# Patient Record
Sex: MALE | Employment: OTHER | URBAN - METROPOLITAN AREA
[De-identification: names, ages, dates, MRNs, and addresses within clinical notes are randomized per-mention and may not be internally consistent; named-entity substitution may affect disease eponyms.]

---

## 2018-09-24 ENCOUNTER — HOSPITAL ENCOUNTER (OUTPATIENT)
Dept: PREADMISSION TESTING | Age: 73
Discharge: HOME | End: 2018-09-24
Payer: MEDICARE

## 2018-09-24 VITALS
HEIGHT: 71 IN | SYSTOLIC BLOOD PRESSURE: 174 MMHG | DIASTOLIC BLOOD PRESSURE: 98 MMHG | OXYGEN SATURATION: 98 % | HEART RATE: 64 BPM | BODY MASS INDEX: 27.65 KG/M2 | WEIGHT: 197.5 LBS | TEMPERATURE: 98.6 F | RESPIRATION RATE: 20 BRPM

## 2018-09-24 LAB
ALBUMIN SERPL-MCNC: 4.2 G/DL (ref 3.5–5)
ALBUMIN/GLOB SERPL: 1.7 {RATIO} (ref 1.1–2.2)
ALP SERPL-CCNC: 75 U/L (ref 45–117)
ALT SERPL-CCNC: 42 U/L (ref 12–78)
ANION GAP SERPL CALC-SCNC: 10 MMOL/L (ref 5–15)
APTT PPP: 27.2 SEC (ref 22.1–32)
AST SERPL-CCNC: 25 U/L (ref 15–37)
ATRIAL RATE: 65 BPM
BASOPHILS # BLD: 0.1 K/UL (ref 0–0.1)
BASOPHILS NFR BLD: 1 % (ref 0–1)
BILIRUB SERPL-MCNC: 0.7 MG/DL (ref 0.2–1)
BUN SERPL-MCNC: 10 MG/DL (ref 6–20)
BUN/CREAT SERPL: 12 (ref 12–20)
CALCIUM SERPL-MCNC: 9.1 MG/DL (ref 8.5–10.1)
CALCULATED P AXIS, ECG09: 67 DEGREES
CALCULATED R AXIS, ECG10: 35 DEGREES
CALCULATED T AXIS, ECG11: 58 DEGREES
CHLORIDE SERPL-SCNC: 102 MMOL/L (ref 97–108)
CO2 SERPL-SCNC: 25 MMOL/L (ref 21–32)
CREAT SERPL-MCNC: 0.81 MG/DL (ref 0.7–1.3)
DIAGNOSIS, 93000: NORMAL
DIFFERENTIAL METHOD BLD: ABNORMAL
EOSINOPHIL # BLD: 0.1 K/UL (ref 0–0.4)
EOSINOPHIL NFR BLD: 1 % (ref 0–7)
ERYTHROCYTE [DISTWIDTH] IN BLOOD BY AUTOMATED COUNT: 12.6 % (ref 11.5–14.5)
EST. AVERAGE GLUCOSE BLD GHB EST-MCNC: 123 MG/DL
GLOBULIN SER CALC-MCNC: 2.5 G/DL (ref 2–4)
GLUCOSE SERPL-MCNC: 96 MG/DL (ref 65–100)
HBA1C MFR BLD: 5.9 % (ref 4.2–6.3)
HCT VFR BLD AUTO: 39.9 % (ref 36.6–50.3)
HGB BLD-MCNC: 13.7 G/DL (ref 12.1–17)
IMM GRANULOCYTES # BLD: 0 K/UL (ref 0–0.04)
IMM GRANULOCYTES NFR BLD AUTO: 1 % (ref 0–0.5)
INR PPP: 1 (ref 0.9–1.1)
LYMPHOCYTES # BLD: 1.6 K/UL (ref 0.8–3.5)
LYMPHOCYTES NFR BLD: 22 % (ref 12–49)
MCH RBC QN AUTO: 32.6 PG (ref 26–34)
MCHC RBC AUTO-ENTMCNC: 34.3 G/DL (ref 30–36.5)
MCV RBC AUTO: 95 FL (ref 80–99)
MONOCYTES # BLD: 0.8 K/UL (ref 0–1)
MONOCYTES NFR BLD: 11 % (ref 5–13)
NEUTS SEG # BLD: 4.4 K/UL (ref 1.8–8)
NEUTS SEG NFR BLD: 64 % (ref 32–75)
NRBC # BLD: 0 K/UL (ref 0–0.01)
NRBC BLD-RTO: 0 PER 100 WBC
P-R INTERVAL, ECG05: 164 MS
PLATELET # BLD AUTO: 221 K/UL (ref 150–400)
PMV BLD AUTO: 10.2 FL (ref 8.9–12.9)
POTASSIUM SERPL-SCNC: 4 MMOL/L (ref 3.5–5.1)
PROT SERPL-MCNC: 6.7 G/DL (ref 6.4–8.2)
PROTHROMBIN TIME: 10.6 SEC (ref 9–11.1)
Q-T INTERVAL, ECG07: 430 MS
QRS DURATION, ECG06: 104 MS
QTC CALCULATION (BEZET), ECG08: 447 MS
RBC # BLD AUTO: 4.2 M/UL (ref 4.1–5.7)
SODIUM SERPL-SCNC: 137 MMOL/L (ref 136–145)
THERAPEUTIC RANGE,PTTT: NORMAL SECS (ref 58–77)
VENTRICULAR RATE, ECG03: 65 BPM
WBC # BLD AUTO: 7 K/UL (ref 4.1–11.1)

## 2018-09-24 PROCEDURE — 80053 COMPREHEN METABOLIC PANEL: CPT | Performed by: ORTHOPAEDIC SURGERY

## 2018-09-24 PROCEDURE — 85025 COMPLETE CBC W/AUTO DIFF WBC: CPT | Performed by: ORTHOPAEDIC SURGERY

## 2018-09-24 PROCEDURE — 86900 BLOOD TYPING SEROLOGIC ABO: CPT | Performed by: ORTHOPAEDIC SURGERY

## 2018-09-24 PROCEDURE — 85610 PROTHROMBIN TIME: CPT | Performed by: ORTHOPAEDIC SURGERY

## 2018-09-24 PROCEDURE — 83036 HEMOGLOBIN GLYCOSYLATED A1C: CPT | Performed by: ORTHOPAEDIC SURGERY

## 2018-09-24 PROCEDURE — 36415 COLL VENOUS BLD VENIPUNCTURE: CPT | Performed by: ORTHOPAEDIC SURGERY

## 2018-09-24 PROCEDURE — 85730 THROMBOPLASTIN TIME PARTIAL: CPT | Performed by: ORTHOPAEDIC SURGERY

## 2018-09-24 PROCEDURE — 93005 ELECTROCARDIOGRAM TRACING: CPT

## 2018-09-24 RX ORDER — CHOLECALCIFEROL (VITAMIN D3) 125 MCG
CAPSULE ORAL
COMMUNITY

## 2018-09-24 RX ORDER — IBUPROFEN 200 MG
TABLET ORAL
COMMUNITY
End: 2018-10-09

## 2018-09-24 RX ORDER — LISINOPRIL AND HYDROCHLOROTHIAZIDE 20; 25 MG/1; MG/1
TABLET ORAL DAILY
COMMUNITY

## 2018-09-24 RX ORDER — OMEPRAZOLE 20 MG/1
20 CAPSULE, DELAYED RELEASE ORAL DAILY
COMMUNITY

## 2018-09-24 RX ORDER — SIMVASTATIN 20 MG/1
TABLET, FILM COATED ORAL
COMMUNITY

## 2018-09-24 NOTE — H&P
PAT Pre-Op History & Physical 
 
Patient: Chhaya Mullins                  MRN: [id number]          SSN: [id number] YOB: 1945          Age: 74 y.o. Sex: male Subjective:  
Patient is a 74 y.o.  male who presents with history of chronic left shoulder pain that has been a problem for \" a couple of years\" per patient report. States that about 6 months ago he was working on a car when he felt a \"pop\" in his left shoulder and states that his pain escalated after that event. Rates his left shoulder pain as high as 8/10 at times and describes it as an intermittent sharp, stabbing pain. Raising his left arm exacerbates his shoulder pain and he states that the pain radiates down into his left upper arm at times. C/o weakness in LUE. Patient is right hand dominant. 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 Has failed steroid joint injections and NSAIDs. The patient was evaluated in the surgeon's office and it was determined that the most appropriate plan of care is to proceed with surgical intervention. Patient's PCP Anneliese Vang MD 
 
Patient states her perforated his right eardrum with a Q tip last week- states he saw PCP and was on antibiotic eardrops for a week but has completed medication. Denies any pain in right ear. Past Medical History:  
Diagnosis Date  Arthritis  GERD (gastroesophageal reflux disease)  High cholesterol  Hypertension  Ill-defined condition 09/24/2018 Overweight  BMI= 27.9  Meniere disease 2008 Resolved in about 2 years  Psychiatric disorder 1970  
 anxiety- no longer on medication  PUD (peptic ulcer disease) 1967 Past Surgical History:  
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  HX ORTHOPAEDIC Right 1948  
 right thumb surgery Prior to Admission medications Medication Sig Start Date End Date Taking? Authorizing Provider  
simvastatin (ZOCOR) 20 mg tablet Take  by mouth nightly. Yes Historical Provider  
lisinopril-hydroCHLOROthiazide (PRINZIDE, ZESTORETIC) 20-25 mg per tablet Take  by mouth daily. Yes Historical Provider  
omeprazole (PRILOSEC) 20 mg capsule Take 20 mg by mouth daily. Yes Historical Provider  
melatonin tab tablet Take  by mouth nightly. Yes Historical Provider  
ibuprofen (MOTRIN) 200 mg tablet Take  by mouth every six (6) hours as needed for Pain. Yes Historical Provider Current Outpatient Prescriptions Medication Sig  
 simvastatin (ZOCOR) 20 mg tablet Take  by mouth nightly.  lisinopril-hydroCHLOROthiazide (PRINZIDE, ZESTORETIC) 20-25 mg per tablet Take  by mouth daily.  omeprazole (PRILOSEC) 20 mg capsule Take 20 mg by mouth daily.  melatonin tab tablet Take  by mouth nightly.  ibuprofen (MOTRIN) 200 mg tablet Take  by mouth every six (6) hours as needed for Pain. No current facility-administered medications for this encounter. No Known Allergies Social History Substance Use Topics  Smoking status: Never Smoker  Smokeless tobacco: Former User Quit date: 9/24/2001 Comment: used to dip  Alcohol use 7.2 oz/week 12 Cans of beer per week Comment: No problem if no ETOH for 5 or more days History Drug Use No  
 
Family History Problem Relation Age of Onset  Parkinson's Disease Mother  No Known Problems Father  No Known Problems Brother Review of Systems Patient denies difficulty swallowing, mouth sores, or loose teeth. Patient denies any recent dental procedures or any planned prior to surgery. Patient denies chest pain, tightness, palpitations.   Denies dizziness, visual disturbances, or lightheadedness. Patient denies shortness of breath, wheezing, cough, fever, or chills. Patient denies diarrhea, constipation, or abdominal pain. Patient denies urinary problems including dysuria, hesitancy, urgency, or incontinence. Denies skin breakdown, rashes, insect bites or open area. C/o left shoulder pain. Objective:  
Patient Vitals for the past 24 hrs: 
 Temp Pulse Resp BP SpO2  
18 1122 98.6 °F (37 °C) 64 20 (!) 174/98 98 % Temp (24hrs), Av.6 °F (37 °C), Min:98.6 °F (37 °C), Max:98.6 °F (37 °C) Body mass index is 27.94 kg/(m^2). Wt Readings from Last 1 Encounters:  
18 89.6 kg (197 lb 8 oz) Physical Exam: 
 
 General: Pleasant,  cooperative, no apparent distress, appears stated age. Eyes: Conjunctivae/corneas clear. EOMs intact. Ears: Rt TM perforated. Lt TM intact- pearly gray. Nose: Nares normal. 
 Mouth/Throat: Lips, mucosa, and tongue normal. Teeth and gums normal. 
 Neck: Supple, symmetrical, trachea midline. Back: Symmetric Lungs: Clear to auscultation bilaterally. Heart: Regular rate and rhythm, S1, S2 normal. No murmur, click, rub or gallop. Abdomen: Soft, non-tender. Bowel sounds normal. No distention. Musculoskeletal:  Left shoulder ROM limited by discomfort Extremities:  Extremities normal, atraumatic, no cyanosis or edema. Calves 
                               supple, non tender to palpation. Pulses: 2+ and symmetric bilateral upper extremities. Cap. refill <2 seconds Skin: Skin color, texture, turgor normal.  No visible rashes or lesions. Neurologic: CN II-XII grossly intact. Alert and oriented x3. Labs:  
Recent Results (from the past 72 hour(s)) CULTURE, MRSA Collection Time: 18 12:12 PM  
Result Value Ref Range Special Requests: NO SPECIAL REQUESTS Culture result: MRSA NOT PRESENT  Culture result:     
      Screening of patient nares for MRSA is for surveillance purposes and, if positive, to facilitate isolation considerations in high risk settings. It is not intended for automatic decolonization interventions per se as regimens are not sufficiently effective to warrant routine use. CBC WITH AUTOMATED DIFF Collection Time: 09/24/18 12:17 PM  
Result Value Ref Range WBC 7.0 4.1 - 11.1 K/uL  
 RBC 4.20 4. 10 - 5.70 M/uL  
 HGB 13.7 12.1 - 17.0 g/dL HCT 39.9 36.6 - 50.3 % MCV 95.0 80.0 - 99.0 FL  
 MCH 32.6 26.0 - 34.0 PG  
 MCHC 34.3 30.0 - 36.5 g/dL  
 RDW 12.6 11.5 - 14.5 % PLATELET 074 790 - 852 K/uL MPV 10.2 8.9 - 12.9 FL  
 NRBC 0.0 0  WBC ABSOLUTE NRBC 0.00 0.00 - 0.01 K/uL NEUTROPHILS 64 32 - 75 % LYMPHOCYTES 22 12 - 49 % MONOCYTES 11 5 - 13 % EOSINOPHILS 1 0 - 7 % BASOPHILS 1 0 - 1 % IMMATURE GRANULOCYTES 1 (H) 0.0 - 0.5 % ABS. NEUTROPHILS 4.4 1.8 - 8.0 K/UL  
 ABS. LYMPHOCYTES 1.6 0.8 - 3.5 K/UL  
 ABS. MONOCYTES 0.8 0.0 - 1.0 K/UL  
 ABS. EOSINOPHILS 0.1 0.0 - 0.4 K/UL  
 ABS. BASOPHILS 0.1 0.0 - 0.1 K/UL  
 ABS. IMM. GRANS. 0.0 0.00 - 0.04 K/UL  
 DF AUTOMATED METABOLIC PANEL, COMPREHENSIVE Collection Time: 09/24/18 12:17 PM  
Result Value Ref Range Sodium 137 136 - 145 mmol/L Potassium 4.0 3.5 - 5.1 mmol/L Chloride 102 97 - 108 mmol/L  
 CO2 25 21 - 32 mmol/L Anion gap 10 5 - 15 mmol/L Glucose 96 65 - 100 mg/dL BUN 10 6 - 20 MG/DL Creatinine 0.81 0.70 - 1.30 MG/DL  
 BUN/Creatinine ratio 12 12 - 20 GFR est AA >60 >60 ml/min/1.73m2 GFR est non-AA >60 >60 ml/min/1.73m2 Calcium 9.1 8.5 - 10.1 MG/DL Bilirubin, total 0.7 0.2 - 1.0 MG/DL  
 ALT (SGPT) 42 12 - 78 U/L  
 AST (SGOT) 25 15 - 37 U/L Alk. phosphatase 75 45 - 117 U/L Protein, total 6.7 6.4 - 8.2 g/dL Albumin 4.2 3.5 - 5.0 g/dL Globulin 2.5 2.0 - 4.0 g/dL A-G Ratio 1.7 1.1 - 2.2 HEMOGLOBIN A1C WITH EAG Collection Time: 09/24/18 12:17 PM  
Result Value Ref Range Hemoglobin A1c 5.9 4.2 - 6.3 % Est. average glucose 123 mg/dL PROTHROMBIN TIME + INR Collection Time: 09/24/18 12:17 PM  
Result Value Ref Range INR 1.0 0.9 - 1.1 Prothrombin time 10.6 9.0 - 11.1 sec PTT Collection Time: 09/24/18 12:17 PM  
Result Value Ref Range aPTT 27.2 22.1 - 32.0 sec  
 aPTT, therapeutic range     58.0 - 77.0 SECS  
TYPE & SCREEN Collection Time: 09/24/18 12:17 PM  
Result Value Ref Range Crossmatch Expiration 09/27/2018 ABO/Rh(D) O POSITIVE Antibody screen NEG   
EKG, 12 LEAD, INITIAL Collection Time: 09/24/18 12:37 PM  
Result Value Ref Range Ventricular Rate 65 BPM  
 Atrial Rate 65 BPM  
 P-R Interval 164 ms QRS Duration 104 ms Q-T Interval 430 ms QTC Calculation (Bezet) 447 ms Calculated P Axis 67 degrees Calculated R Axis 35 degrees Calculated T Axis 58 degrees Diagnosis Normal sinus rhythm RSR' pattern in V1 Borderline ECG No previous ECGs available Confirmed by Ventura Allen MD, [id number] ([id number]) on 9/24/2018 2:53:02 PM 
  
URINALYSIS W/ REFLEX CULTURE Collection Time: 09/25/18 11:54 AM  
Result Value Ref Range Color YELLOW/STRAW Appearance CLEAR CLEAR Specific gravity 1.006 1.003 - 1.030    
 pH (UA) 6.5 5.0 - 8.0 Protein NEGATIVE  NEG mg/dL Glucose NEGATIVE  NEG mg/dL Ketone NEGATIVE  NEG mg/dL Bilirubin NEGATIVE  NEG Blood NEGATIVE  NEG Urobilinogen 0.2 0.2 - 1.0 EU/dL Nitrites NEGATIVE  NEG Leukocyte Esterase NEGATIVE  NEG    
 WBC 0-4 0 - 4 /hpf  
 RBC 0-5 0 - 5 /hpf Epithelial cells FEW FEW /lpf Bacteria NEGATIVE  NEG /hpf  
 UA:UC IF INDICATED CULTURE NOT INDICATED BY UA RESULT CNI Hyaline cast 0-2 0 - 5 /lpf Assessment:  
 
Osteoarthritis of left glenohumeral joint Bicep tendinitis of left upper extremity. Perforated right tympanic membrane Plan:  
 
Scheduled for left total shoulder arthroplasty with biceps tenodesis and axillary nerve dissection. Labs and EKG done per surgeon's orders. Lab and EKG results reviewed- unremarkable. MRSA negative. Surgeon's office notified of perforated right eardrum.  
 
 
 
 
Susan Sainz NP

## 2018-09-24 NOTE — PERIOP NOTES
INSTRUCTIONS [address] [address] [address] [facility] MAIN OR [phone] MAIN PRE OP [phone] AMBULATORY PRE OP [phone] PRE-ADMISSION TESTING 21  Surgery Date:   10/8/18 Is surgery arrival time given by surgeon? NO [facility] will [facility] you between 3 and 7pm the day before your surgery with your arrival time. (If your surgery is on a Monday, we will call you the Friday before.) Call [phone] after 7pm Monday-Friday if you did not receive your arrival time. Answers to Common Questions When You 
Arrive Arrive at the [facility] [address] [facility] on the day of your surgery Have your insurance card, photo ID, and any copayment (if needed) Food 
 and  
Drink NO food or drink after midnight the night before surgery This means NO water, gum, mints, coffee, juice, etc. 
No alcohol (beer, wine, liquor) 24 hours before and after surgery Medicine to TAKE Morning of Surgery MEDICATIONS TO TAKE THE MORNING OF SURGERY WITH A SIP OF WATER:  
? Omeprazole Medicine To 
STOP  
FOR PAIN 
? You can take Tylenol  follow instructions on the bottle 
? NO Aspirin for pain ? NO Non-Steroidal Anti-Inflammatory Drugs (NSAIDs:  
for example, Ibuprofen (Advil, Motrin), Naproxen (Aleve) ? STOP herbal supplements and vitamins 1 week before surgery Blood Thinners ? If you take Aspirin, Plavix, Coumadin, blood-thinning or anti-clot medicine, talk to your surgeon and/or follow the instructions from the doctor who told you to take that medicine Clothing Jewelry Valuables Bathing CLOTHING 
? Wear loose, comfortable clothes ? Wear glasses instead of contacts ? Leave money, jewelry and valuables at home ? No make-up, particularly mascara, the day of surgery ? REMOVE ALL piercings, rings, and jewelry - leave at home ? Wear hair loose or down; no pony-tails, buns, or metal hair clips BATHING 
? Follow all special bath instructions (for total joint replacement, spine and bowel surgeries.) ? If you shower the morning of surgery, please do not apply any lotions, powders, or deodorants afterwards. Do not shave or trim anywhere 24 hours before surgery. Going Home 
or Spending the Night  
? SAME-DAY SURGERY: You must have a responsible adult drive you home and stay with you 24 hours after surgery ? ADMITS: If your doctor is keeping you into the hospital after surgery, leave personal belongings/luggage in your car until you have a hospital room number. Hospital discharge time is 12 noon Drivers must be here before 12 noon unless you are told differently Follow all instructions so your surgery wont be cancelled. Please, be on time. If a situation occurs and you are delayed the day of surgery, call [phone] or [phone]. If your physical condition changes (like a fever, cold, flu, etc.) call your surgeon as soon as possible. The [facility] staff can be reached at [phone]. OTHER SPECIAL INSTRUCTIONS:  Free  parking, Special Instructions: · Use Chlorhexidine Care Fusion wash and sponges 3 days prior to surgery as instructed. · Incentive spirometer given with instructions to practice at home and bring back to the hospital on the day of surgery. · [facility] will contact you if your Hemoglobin A1C is greater than 7.5. · Ensure/Glucerna  sample, nutritional information, and Ensure/Glucerna coupon given. · Pain pamphlet and Call Don't Fall reminder reviewed with patient. ·  parking is complimentary Monday - Friday 7 am - 5 pm 
· Bring PTA Medication list day of surgery with the last doses taken documented Do not bring medication bottles the day of surgery The patient was contacted  in person. He  verbalize  understanding of all instructions and does not  need reinforcement.

## 2018-09-24 NOTE — PERIOP NOTES
Notified by lab of mislabelled urine specimen grey top. Called patient. Explained mislabel of urine specimen and need for repeat at his convenience in the next week. Explained importance of urine specimen in prevention of surgical complications. Patient agreed to return tomorrow 9/25/18 for urine specimen for urine with reflex culture.

## 2018-09-25 ENCOUNTER — HOSPITAL ENCOUNTER (OUTPATIENT)
Dept: PREADMISSION TESTING | Age: 73
Discharge: HOME | End: 2018-09-25
Payer: MEDICARE

## 2018-09-25 LAB
APPEARANCE UR: CLEAR
BACTERIA SPEC CULT: NORMAL
BACTERIA SPEC CULT: NORMAL
BACTERIA URNS QL MICRO: NEGATIVE /HPF
BILIRUB UR QL: NEGATIVE
COLOR UR: NORMAL
EPITH CASTS URNS QL MICRO: NORMAL /LPF
GLUCOSE UR STRIP.AUTO-MCNC: NEGATIVE MG/DL
HGB UR QL STRIP: NEGATIVE
HYALINE CASTS URNS QL MICRO: NORMAL /LPF (ref 0–5)
KETONES UR QL STRIP.AUTO: NEGATIVE MG/DL
LEUKOCYTE ESTERASE UR QL STRIP.AUTO: NEGATIVE
NITRITE UR QL STRIP.AUTO: NEGATIVE
PH UR STRIP: 6.5 [PH] (ref 5–8)
PROT UR STRIP-MCNC: NEGATIVE MG/DL
RBC #/AREA URNS HPF: NORMAL /HPF (ref 0–5)
SERVICE CMNT-IMP: NORMAL
SP GR UR REFRACTOMETRY: 1.01 (ref 1–1.03)
UA: UC IF INDICATED,UAUC: NORMAL
UROBILINOGEN UR QL STRIP.AUTO: 0.2 EU/DL (ref 0.2–1)
WBC URNS QL MICRO: NORMAL /HPF (ref 0–4)

## 2018-09-25 PROCEDURE — 81001 URINALYSIS AUTO W/SCOPE: CPT | Performed by: ANESTHESIOLOGY

## 2018-10-05 ENCOUNTER — ANESTHESIA EVENT (OUTPATIENT)
Dept: SURGERY | Age: 73
DRG: 483 | End: 2018-10-05
Payer: MEDICARE

## 2018-10-05 LAB
ABO + RH BLD: NORMAL
BLOOD GROUP ANTIBODIES SERPL: NORMAL
SPECIMEN EXP DATE BLD: NORMAL

## 2018-10-05 NOTE — PERIOP NOTES
Faxed the blood transfusion history questionnaire to the blood bank and spoke to Maykel Garduno requesting that the T&S expiration date be extended to the day of surgery. DOS: 10/8/2018

## 2018-10-08 ENCOUNTER — APPOINTMENT (OUTPATIENT)
Dept: GENERAL RADIOLOGY | Age: 73
DRG: 483 | End: 2018-10-08
Attending: ORTHOPAEDIC SURGERY
Payer: MEDICARE

## 2018-10-08 ENCOUNTER — ANESTHESIA (OUTPATIENT)
Dept: SURGERY | Age: 73
DRG: 483 | End: 2018-10-08
Payer: MEDICARE

## 2018-10-08 ENCOUNTER — HOSPITAL ENCOUNTER (INPATIENT)
Age: 73
LOS: 1 days | Discharge: HOME | DRG: 483 | End: 2018-10-09
Attending: ORTHOPAEDIC SURGERY | Admitting: ORTHOPAEDIC SURGERY
Payer: MEDICARE

## 2018-10-08 DIAGNOSIS — M19.012 PRIMARY OSTEOARTHRITIS OF LEFT SHOULDER: Primary | ICD-10-CM

## 2018-10-08 PROCEDURE — C1776 JOINT DEVICE (IMPLANTABLE): HCPCS | Performed by: ORTHOPAEDIC SURGERY

## 2018-10-08 PROCEDURE — 77030013837 HC NERV BLK KT BBMI -B

## 2018-10-08 PROCEDURE — 77030020268 HC MISC GENERAL SUPPLY: Performed by: ORTHOPAEDIC SURGERY

## 2018-10-08 PROCEDURE — 77030020782 HC GWN BAIR PAWS FLX 3M -B

## 2018-10-08 PROCEDURE — 77030019908 HC STETH ESOPH SIMS -A: Performed by: NURSE ANESTHETIST, CERTIFIED REGISTERED

## 2018-10-08 PROCEDURE — C1713 ANCHOR/SCREW BN/BN,TIS/BN: HCPCS | Performed by: ORTHOPAEDIC SURGERY

## 2018-10-08 PROCEDURE — 65270000029 HC RM PRIVATE

## 2018-10-08 PROCEDURE — 77030018836 HC SOL IRR NACL ICUM -A: Performed by: ORTHOPAEDIC SURGERY

## 2018-10-08 PROCEDURE — 77030039266 HC ADH SKN EXOFIN S2SG -A: Performed by: ORTHOPAEDIC SURGERY

## 2018-10-08 PROCEDURE — 3E0T3BZ INTRODUCTION OF ANESTHETIC AGENT INTO PERIPHERAL NERVES AND PLEXI, PERCUTANEOUS APPROACH: ICD-10-PCS | Performed by: ANESTHESIOLOGY

## 2018-10-08 PROCEDURE — 0RRK0JZ REPLACEMENT OF LEFT SHOULDER JOINT WITH SYNTHETIC SUBSTITUTE, OPEN APPROACH: ICD-10-PCS | Performed by: ORTHOPAEDIC SURGERY

## 2018-10-08 PROCEDURE — 77030032497 HC WRP SHLDR WO BGS SOLM -B

## 2018-10-08 PROCEDURE — 77030002933 HC SUT MCRYL J&J -A: Performed by: ORTHOPAEDIC SURGERY

## 2018-10-08 PROCEDURE — 77030021303 HC BUR FLUT MIDAS BRSM -B: Performed by: ORTHOPAEDIC SURGERY

## 2018-10-08 PROCEDURE — 74011250636 HC RX REV CODE- 250/636

## 2018-10-08 PROCEDURE — 76060000035 HC ANESTHESIA 2 TO 2.5 HR: Performed by: ORTHOPAEDIC SURGERY

## 2018-10-08 PROCEDURE — 77030028700 HC BLD TISS PLSM MEDT -E: Performed by: ORTHOPAEDIC SURGERY

## 2018-10-08 PROCEDURE — 77030002922 HC SUT FBRWRE ARTH -B: Performed by: ORTHOPAEDIC SURGERY

## 2018-10-08 PROCEDURE — 77030031139 HC SUT VCRL2 J&J -A: Performed by: ORTHOPAEDIC SURGERY

## 2018-10-08 PROCEDURE — 74011000272 HC RX REV CODE- 272: Performed by: ORTHOPAEDIC SURGERY

## 2018-10-08 PROCEDURE — 74011250636 HC RX REV CODE- 250/636: Performed by: ANESTHESIOLOGY

## 2018-10-08 PROCEDURE — 74011250636 HC RX REV CODE- 250/636: Performed by: ORTHOPAEDIC SURGERY

## 2018-10-08 PROCEDURE — 74011250637 HC RX REV CODE- 250/637: Performed by: ANESTHESIOLOGY

## 2018-10-08 PROCEDURE — 77030010785: Performed by: ORTHOPAEDIC SURGERY

## 2018-10-08 PROCEDURE — 77030018673: Performed by: ORTHOPAEDIC SURGERY

## 2018-10-08 PROCEDURE — 77030008684 HC TU ET CUF COVD -B: Performed by: NURSE ANESTHETIST, CERTIFIED REGISTERED

## 2018-10-08 PROCEDURE — 77030038020 HC MANFLD NEPTUNE STRY -B: Performed by: ORTHOPAEDIC SURGERY

## 2018-10-08 PROCEDURE — 77030010507 HC ADH SKN DERMBND J&J -B

## 2018-10-08 PROCEDURE — 77030011640 HC PAD GRND REM COVD -A: Performed by: ORTHOPAEDIC SURGERY

## 2018-10-08 PROCEDURE — 77030020788: Performed by: ORTHOPAEDIC SURGERY

## 2018-10-08 PROCEDURE — 0LS40ZZ REPOSITION LEFT UPPER ARM TENDON, OPEN APPROACH: ICD-10-PCS | Performed by: ORTHOPAEDIC SURGERY

## 2018-10-08 PROCEDURE — 74011000250 HC RX REV CODE- 250: Performed by: ORTHOPAEDIC SURGERY

## 2018-10-08 PROCEDURE — 77030026438 HC STYL ET INTUB CARD -A: Performed by: NURSE ANESTHETIST, CERTIFIED REGISTERED

## 2018-10-08 PROCEDURE — 77030013708 HC HNDPC SUC IRR PULS STRY –B: Performed by: ORTHOPAEDIC SURGERY

## 2018-10-08 PROCEDURE — 94762 N-INVAS EAR/PLS OXIMTRY CONT: CPT

## 2018-10-08 PROCEDURE — 64415 NJX AA&/STRD BRCH PLXS IMG: CPT

## 2018-10-08 PROCEDURE — 76210000000 HC OR PH I REC 2 TO 2.5 HR: Performed by: ORTHOPAEDIC SURGERY

## 2018-10-08 PROCEDURE — 01N30ZZ RELEASE BRACHIAL PLEXUS, OPEN APPROACH: ICD-10-PCS | Performed by: ORTHOPAEDIC SURGERY

## 2018-10-08 PROCEDURE — 74011000250 HC RX REV CODE- 250: Performed by: ANESTHESIOLOGY

## 2018-10-08 PROCEDURE — 76010000171 HC OR TIME 2 TO 2.5 HR INTENSV-TIER 1: Performed by: ORTHOPAEDIC SURGERY

## 2018-10-08 PROCEDURE — 74011250637 HC RX REV CODE- 250/637: Performed by: ORTHOPAEDIC SURGERY

## 2018-10-08 PROCEDURE — 77030003882 HC BIT DRL TWST BRSM -B: Performed by: ORTHOPAEDIC SURGERY

## 2018-10-08 PROCEDURE — 77030018883 HC BLD SAW SAG4 STRY -B: Performed by: ORTHOPAEDIC SURGERY

## 2018-10-08 PROCEDURE — 74011000250 HC RX REV CODE- 250

## 2018-10-08 PROCEDURE — 73020 X-RAY EXAM OF SHOULDER: CPT

## 2018-10-08 DEVICE — IMPLANTABLE DEVICE
Type: IMPLANTABLE DEVICE | Site: SHOULDER | Status: FUNCTIONAL
Brand: COMPREHENSIVE SHOULDER SYSTEM

## 2018-10-08 DEVICE — COMP SHRT ST/RGX GLD/VRSDL/INS: Type: IMPLANTABLE DEVICE | Status: FUNCTIONAL

## 2018-10-08 DEVICE — IMPLANTABLE DEVICE
Type: IMPLANTABLE DEVICE | Site: SHOULDER | Status: FUNCTIONAL
Brand: HYBRID GLENOID

## 2018-10-08 DEVICE — SIMPLEX® HV WITH GENTAMICIN IS A FAST-SETTING ACRYLIC RESIN WITH ADDITION OF GENTAMICIN SULFATE FOR USE IN BONE SURGERY. MIXING THE TWO SEPARATE STERILE COMPONENTS PRODUCES A DUCTILE BONE CEMENT WHICH, AFTER HARDENING, FIXES THE IMPLANT AND TRANSFERS STRESSES PRODUCED DURING MOVEMENT EVENLY TO THE BONE. SIMPLEX® HV WITH GENTAMICINN CEMENT POWDER ALSO CONTAINS INSOLUBLE ZIRCONIUM DIOXIDE AS AN X-RAY CONTRAST MEDIUM. SIMPLEX® HV WITH GENTAMICIN DOES NOT EMIT A SIGNAL AND DOES NOT POSE A SAFETY RISK IN A MAGNETIC RESONANCE ENVIRONMENT.
Type: IMPLANTABLE DEVICE | Site: SHOULDER | Status: FUNCTIONAL
Brand: SIMPLEX HV WITH GENTAMICIN

## 2018-10-08 RX ORDER — EPHEDRINE SULFATE 50 MG/ML
INJECTION, SOLUTION INTRAVENOUS AS NEEDED
Status: DISCONTINUED | OUTPATIENT
Start: 2018-10-08 | End: 2018-10-08 | Stop reason: HOSPADM

## 2018-10-08 RX ORDER — OXYCODONE HYDROCHLORIDE 5 MG/1
5 TABLET ORAL
Status: DISCONTINUED | OUTPATIENT
Start: 2018-10-08 | End: 2018-10-09 | Stop reason: HOSPADM

## 2018-10-08 RX ORDER — TRAMADOL HYDROCHLORIDE 50 MG/1
50 TABLET ORAL
Status: DISCONTINUED | OUTPATIENT
Start: 2018-10-08 | End: 2018-10-09 | Stop reason: HOSPADM

## 2018-10-08 RX ORDER — HYDROMORPHONE HYDROCHLORIDE 1 MG/ML
.25-1 INJECTION, SOLUTION INTRAMUSCULAR; INTRAVENOUS; SUBCUTANEOUS
Status: DISCONTINUED | OUTPATIENT
Start: 2018-10-08 | End: 2018-10-08 | Stop reason: HOSPADM

## 2018-10-08 RX ORDER — SODIUM CHLORIDE, SODIUM LACTATE, POTASSIUM CHLORIDE, CALCIUM CHLORIDE 600; 310; 30; 20 MG/100ML; MG/100ML; MG/100ML; MG/100ML
125 INJECTION, SOLUTION INTRAVENOUS CONTINUOUS
Status: DISCONTINUED | OUTPATIENT
Start: 2018-10-08 | End: 2018-10-08 | Stop reason: HOSPADM

## 2018-10-08 RX ORDER — PROPOFOL 10 MG/ML
INJECTION, EMULSION INTRAVENOUS AS NEEDED
Status: DISCONTINUED | OUTPATIENT
Start: 2018-10-08 | End: 2018-10-08 | Stop reason: HOSPADM

## 2018-10-08 RX ORDER — FENTANYL CITRATE 50 UG/ML
INJECTION, SOLUTION INTRAMUSCULAR; INTRAVENOUS AS NEEDED
Status: DISCONTINUED | OUTPATIENT
Start: 2018-10-08 | End: 2018-10-08 | Stop reason: HOSPADM

## 2018-10-08 RX ORDER — HYDROMORPHONE HYDROCHLORIDE 2 MG/ML
0.5 INJECTION, SOLUTION INTRAMUSCULAR; INTRAVENOUS; SUBCUTANEOUS
Status: DISCONTINUED | OUTPATIENT
Start: 2018-10-08 | End: 2018-10-09 | Stop reason: HOSPADM

## 2018-10-08 RX ORDER — SODIUM CHLORIDE 0.9 % (FLUSH) 0.9 %
5-10 SYRINGE (ML) INJECTION EVERY 8 HOURS
Status: DISCONTINUED | OUTPATIENT
Start: 2018-10-08 | End: 2018-10-08 | Stop reason: HOSPADM

## 2018-10-08 RX ORDER — SODIUM CHLORIDE 9 MG/ML
125 INJECTION, SOLUTION INTRAVENOUS CONTINUOUS
Status: DISCONTINUED | OUTPATIENT
Start: 2018-10-08 | End: 2018-10-09 | Stop reason: HOSPADM

## 2018-10-08 RX ORDER — ASPIRIN 325 MG
325 TABLET, DELAYED RELEASE (ENTERIC COATED) ORAL 2 TIMES DAILY
Status: DISCONTINUED | OUTPATIENT
Start: 2018-10-08 | End: 2018-10-09 | Stop reason: HOSPADM

## 2018-10-08 RX ORDER — ONDANSETRON 2 MG/ML
4 INJECTION INTRAMUSCULAR; INTRAVENOUS
Status: DISCONTINUED | OUTPATIENT
Start: 2018-10-08 | End: 2018-10-09 | Stop reason: HOSPADM

## 2018-10-08 RX ORDER — AMOXICILLIN 250 MG
1 CAPSULE ORAL 2 TIMES DAILY
Status: DISCONTINUED | OUTPATIENT
Start: 2018-10-08 | End: 2018-10-09 | Stop reason: HOSPADM

## 2018-10-08 RX ORDER — OXYCODONE HYDROCHLORIDE 5 MG/1
10 TABLET ORAL
Status: DISCONTINUED | OUTPATIENT
Start: 2018-10-08 | End: 2018-10-09 | Stop reason: HOSPADM

## 2018-10-08 RX ORDER — OXYCODONE HYDROCHLORIDE 5 MG/1
5 TABLET ORAL
Status: DISCONTINUED | OUTPATIENT
Start: 2018-10-08 | End: 2018-10-08 | Stop reason: HOSPADM

## 2018-10-08 RX ORDER — SODIUM CHLORIDE 0.9 % (FLUSH) 0.9 %
5-10 SYRINGE (ML) INJECTION AS NEEDED
Status: DISCONTINUED | OUTPATIENT
Start: 2018-10-08 | End: 2018-10-08 | Stop reason: HOSPADM

## 2018-10-08 RX ORDER — FAMOTIDINE 20 MG/1
20 TABLET, FILM COATED ORAL 2 TIMES DAILY
Status: DISCONTINUED | OUTPATIENT
Start: 2018-10-08 | End: 2018-10-09 | Stop reason: HOSPADM

## 2018-10-08 RX ORDER — DIPHENHYDRAMINE HYDROCHLORIDE 50 MG/ML
12.5 INJECTION, SOLUTION INTRAMUSCULAR; INTRAVENOUS AS NEEDED
Status: DISCONTINUED | OUTPATIENT
Start: 2018-10-08 | End: 2018-10-08 | Stop reason: HOSPADM

## 2018-10-08 RX ORDER — POLYETHYLENE GLYCOL 3350 17 G/17G
17 POWDER, FOR SOLUTION ORAL DAILY
Status: DISCONTINUED | OUTPATIENT
Start: 2018-10-09 | End: 2018-10-09 | Stop reason: HOSPADM

## 2018-10-08 RX ORDER — GLYCOPYRROLATE 0.2 MG/ML
INJECTION INTRAMUSCULAR; INTRAVENOUS AS NEEDED
Status: DISCONTINUED | OUTPATIENT
Start: 2018-10-08 | End: 2018-10-08 | Stop reason: HOSPADM

## 2018-10-08 RX ORDER — ONDANSETRON 2 MG/ML
INJECTION INTRAMUSCULAR; INTRAVENOUS AS NEEDED
Status: DISCONTINUED | OUTPATIENT
Start: 2018-10-08 | End: 2018-10-08 | Stop reason: HOSPADM

## 2018-10-08 RX ORDER — NEOSTIGMINE METHYLSULFATE 1 MG/ML
INJECTION INTRAVENOUS AS NEEDED
Status: DISCONTINUED | OUTPATIENT
Start: 2018-10-08 | End: 2018-10-08 | Stop reason: HOSPADM

## 2018-10-08 RX ORDER — SODIUM CHLORIDE 0.9 % (FLUSH) 0.9 %
5-10 SYRINGE (ML) INJECTION EVERY 8 HOURS
Status: DISCONTINUED | OUTPATIENT
Start: 2018-10-08 | End: 2018-10-09 | Stop reason: HOSPADM

## 2018-10-08 RX ORDER — CELECOXIB 100 MG/1
100 CAPSULE ORAL ONCE
Status: COMPLETED | OUTPATIENT
Start: 2018-10-08 | End: 2018-10-08

## 2018-10-08 RX ORDER — DIPHENHYDRAMINE HCL 12.5MG/5ML
12.5 ELIXIR ORAL
Status: DISCONTINUED | OUTPATIENT
Start: 2018-10-08 | End: 2018-10-09 | Stop reason: HOSPADM

## 2018-10-08 RX ORDER — FLUMAZENIL 0.1 MG/ML
0.2 INJECTION INTRAVENOUS
Status: DISCONTINUED | OUTPATIENT
Start: 2018-10-08 | End: 2018-10-08 | Stop reason: HOSPADM

## 2018-10-08 RX ORDER — ROCURONIUM BROMIDE 10 MG/ML
INJECTION, SOLUTION INTRAVENOUS AS NEEDED
Status: DISCONTINUED | OUTPATIENT
Start: 2018-10-08 | End: 2018-10-08 | Stop reason: HOSPADM

## 2018-10-08 RX ORDER — NALOXONE HYDROCHLORIDE 0.4 MG/ML
0.4 INJECTION, SOLUTION INTRAMUSCULAR; INTRAVENOUS; SUBCUTANEOUS AS NEEDED
Status: DISCONTINUED | OUTPATIENT
Start: 2018-10-08 | End: 2018-10-09 | Stop reason: HOSPADM

## 2018-10-08 RX ORDER — OXYCODONE HCL 10 MG/1
10 TABLET, FILM COATED, EXTENDED RELEASE ORAL ONCE
Status: COMPLETED | OUTPATIENT
Start: 2018-10-08 | End: 2018-10-08

## 2018-10-08 RX ORDER — SODIUM CHLORIDE 0.9 % (FLUSH) 0.9 %
5-10 SYRINGE (ML) INJECTION AS NEEDED
Status: DISCONTINUED | OUTPATIENT
Start: 2018-10-08 | End: 2018-10-09 | Stop reason: HOSPADM

## 2018-10-08 RX ORDER — ACETAMINOPHEN 325 MG/1
650 TABLET ORAL EVERY 6 HOURS
Status: DISCONTINUED | OUTPATIENT
Start: 2018-10-08 | End: 2018-10-09 | Stop reason: HOSPADM

## 2018-10-08 RX ORDER — LIDOCAINE HYDROCHLORIDE 10 MG/ML
0.1 INJECTION, SOLUTION EPIDURAL; INFILTRATION; INTRACAUDAL; PERINEURAL AS NEEDED
Status: DISCONTINUED | OUTPATIENT
Start: 2018-10-08 | End: 2018-10-08 | Stop reason: HOSPADM

## 2018-10-08 RX ORDER — LIDOCAINE HYDROCHLORIDE 20 MG/ML
INJECTION, SOLUTION EPIDURAL; INFILTRATION; INTRACAUDAL; PERINEURAL AS NEEDED
Status: DISCONTINUED | OUTPATIENT
Start: 2018-10-08 | End: 2018-10-08 | Stop reason: HOSPADM

## 2018-10-08 RX ORDER — FENTANYL CITRATE 50 UG/ML
25 INJECTION, SOLUTION INTRAMUSCULAR; INTRAVENOUS
Status: DISCONTINUED | OUTPATIENT
Start: 2018-10-08 | End: 2018-10-08 | Stop reason: HOSPADM

## 2018-10-08 RX ORDER — CEFAZOLIN SODIUM/WATER 2 G/20 ML
2 SYRINGE (ML) INTRAVENOUS ONCE
Status: COMPLETED | OUTPATIENT
Start: 2018-10-08 | End: 2018-10-08

## 2018-10-08 RX ORDER — DEXAMETHASONE SODIUM PHOSPHATE 4 MG/ML
INJECTION, SOLUTION INTRA-ARTICULAR; INTRALESIONAL; INTRAMUSCULAR; INTRAVENOUS; SOFT TISSUE AS NEEDED
Status: DISCONTINUED | OUTPATIENT
Start: 2018-10-08 | End: 2018-10-08 | Stop reason: HOSPADM

## 2018-10-08 RX ORDER — NALOXONE HYDROCHLORIDE 0.4 MG/ML
0.2 INJECTION, SOLUTION INTRAMUSCULAR; INTRAVENOUS; SUBCUTANEOUS
Status: DISCONTINUED | OUTPATIENT
Start: 2018-10-08 | End: 2018-10-08 | Stop reason: HOSPADM

## 2018-10-08 RX ORDER — KETOROLAC TROMETHAMINE 30 MG/ML
15 INJECTION, SOLUTION INTRAMUSCULAR; INTRAVENOUS
Status: DISCONTINUED | OUTPATIENT
Start: 2018-10-08 | End: 2018-10-08 | Stop reason: HOSPADM

## 2018-10-08 RX ORDER — MIDAZOLAM HYDROCHLORIDE 1 MG/ML
INJECTION, SOLUTION INTRAMUSCULAR; INTRAVENOUS AS NEEDED
Status: DISCONTINUED | OUTPATIENT
Start: 2018-10-08 | End: 2018-10-08 | Stop reason: HOSPADM

## 2018-10-08 RX ORDER — ACETAMINOPHEN 325 MG/1
975 TABLET ORAL ONCE
Status: COMPLETED | OUTPATIENT
Start: 2018-10-08 | End: 2018-10-08

## 2018-10-08 RX ORDER — CEFAZOLIN SODIUM/WATER 2 G/20 ML
2 SYRINGE (ML) INTRAVENOUS EVERY 8 HOURS
Status: COMPLETED | OUTPATIENT
Start: 2018-10-08 | End: 2018-10-09

## 2018-10-08 RX ADMIN — PROPOFOL 150 MG: 10 INJECTION, EMULSION INTRAVENOUS at 11:27

## 2018-10-08 RX ADMIN — PROPOFOL 50 MG: 10 INJECTION, EMULSION INTRAVENOUS at 12:09

## 2018-10-08 RX ADMIN — CELECOXIB 100 MG: 100 CAPSULE ORAL at 09:31

## 2018-10-08 RX ADMIN — EPHEDRINE SULFATE 10 MG: 50 INJECTION, SOLUTION INTRAVENOUS at 12:32

## 2018-10-08 RX ADMIN — ROCURONIUM BROMIDE 40 MG: 10 INJECTION, SOLUTION INTRAVENOUS at 11:27

## 2018-10-08 RX ADMIN — MIDAZOLAM HYDROCHLORIDE 2 MG: 1 INJECTION, SOLUTION INTRAMUSCULAR; INTRAVENOUS at 10:58

## 2018-10-08 RX ADMIN — EPHEDRINE SULFATE 10 MG: 50 INJECTION, SOLUTION INTRAVENOUS at 12:07

## 2018-10-08 RX ADMIN — FENTANYL CITRATE 50 MCG: 50 INJECTION, SOLUTION INTRAMUSCULAR; INTRAVENOUS at 11:51

## 2018-10-08 RX ADMIN — ROCURONIUM BROMIDE 10 MG: 10 INJECTION, SOLUTION INTRAVENOUS at 11:50

## 2018-10-08 RX ADMIN — SODIUM CHLORIDE 125 ML/HR: 900 INJECTION, SOLUTION INTRAVENOUS at 13:58

## 2018-10-08 RX ADMIN — SENNOSIDES AND DOCUSATE SODIUM 1 TABLET: 8.6; 5 TABLET ORAL at 18:20

## 2018-10-08 RX ADMIN — GLYCOPYRROLATE 0.4 MG: 0.2 INJECTION INTRAMUSCULAR; INTRAVENOUS at 13:16

## 2018-10-08 RX ADMIN — NEOSTIGMINE METHYLSULFATE 2 MG: 1 INJECTION INTRAVENOUS at 13:16

## 2018-10-08 RX ADMIN — OXYCODONE HYDROCHLORIDE 10 MG: 10 TABLET, FILM COATED, EXTENDED RELEASE ORAL at 09:32

## 2018-10-08 RX ADMIN — ACETAMINOPHEN 650 MG: 325 TABLET ORAL at 23:38

## 2018-10-08 RX ADMIN — EPHEDRINE SULFATE 10 MG: 50 INJECTION, SOLUTION INTRAVENOUS at 12:26

## 2018-10-08 RX ADMIN — BUPIVACAINE HYDROCHLORIDE 6 ML/HR: 2.5 INJECTION, SOLUTION EPIDURAL; INFILTRATION; INTRACAUDAL; PERINEURAL at 14:29

## 2018-10-08 RX ADMIN — LIDOCAINE HYDROCHLORIDE 40 MG: 20 INJECTION, SOLUTION EPIDURAL; INFILTRATION; INTRACAUDAL; PERINEURAL at 11:27

## 2018-10-08 RX ADMIN — ONDANSETRON 4 MG: 2 INJECTION INTRAMUSCULAR; INTRAVENOUS at 13:05

## 2018-10-08 RX ADMIN — PROPOFOL 50 MG: 10 INJECTION, EMULSION INTRAVENOUS at 13:19

## 2018-10-08 RX ADMIN — ACETAMINOPHEN 650 MG: 325 TABLET ORAL at 18:20

## 2018-10-08 RX ADMIN — FENTANYL CITRATE 100 MCG: 50 INJECTION, SOLUTION INTRAMUSCULAR; INTRAVENOUS at 10:58

## 2018-10-08 RX ADMIN — Medication 2 G: at 11:31

## 2018-10-08 RX ADMIN — ASPIRIN 325 MG: 325 TABLET, DELAYED RELEASE ORAL at 18:20

## 2018-10-08 RX ADMIN — FENTANYL CITRATE 50 MCG: 50 INJECTION, SOLUTION INTRAMUSCULAR; INTRAVENOUS at 11:26

## 2018-10-08 RX ADMIN — Medication 2 G: at 19:00

## 2018-10-08 RX ADMIN — FAMOTIDINE 20 MG: 20 TABLET ORAL at 18:20

## 2018-10-08 RX ADMIN — SODIUM CHLORIDE, SODIUM LACTATE, POTASSIUM CHLORIDE, AND CALCIUM CHLORIDE: 600; 310; 30; 20 INJECTION, SOLUTION INTRAVENOUS at 12:03

## 2018-10-08 RX ADMIN — ACETAMINOPHEN 975 MG: 325 TABLET ORAL at 09:32

## 2018-10-08 RX ADMIN — SODIUM CHLORIDE, SODIUM LACTATE, POTASSIUM CHLORIDE, AND CALCIUM CHLORIDE 125 ML/HR: 600; 310; 30; 20 INJECTION, SOLUTION INTRAVENOUS at 09:21

## 2018-10-08 RX ADMIN — DEXAMETHASONE SODIUM PHOSPHATE 4 MG: 4 INJECTION, SOLUTION INTRA-ARTICULAR; INTRALESIONAL; INTRAMUSCULAR; INTRAVENOUS; SOFT TISSUE at 11:41

## 2018-10-08 NOTE — ANESTHESIA PREPROCEDURE EVALUATION
Anesthetic History No history of anesthetic complications Review of Systems / Medical History Patient summary reviewed, nursing notes reviewed and pertinent labs reviewed Pulmonary Within defined limits Neuro/Psych Within defined limits Cardiovascular Within defined limits Hypertension Exercise tolerance: >4 METS 
  
GI/Hepatic/Renal 
Within defined limits GERD 
 
 
PUD Endo/Other Within defined limits Arthritis Other Findings Comments: Meniere's disease Peptic ulcer 1967 Physical Exam 
 
Airway Mallampati: II 
 
Neck ROM: normal range of motion Mouth opening: Normal 
 
 Cardiovascular Regular rate and rhythm,  S1 and S2 normal,  no murmur, click, rub, or gallop Rhythm: regular Rate: normal 
 
 
 
 Dental 
No notable dental hx Pulmonary Breath sounds clear to auscultation Abdominal 
GI exam deferred Other Findings Anesthetic Plan ASA: 2 Anesthesia type: general and regional - interscalene block Induction: Intravenous Anesthetic plan and risks discussed with: Patient

## 2018-10-08 NOTE — IP AVS SNAPSHOT
[address] 
[phone] Patient: Anitra Boogie MRN: [id number] ALEXUS:[birth year] About your hospitalization You were admitted on:  October 8, 2018 You last received care in the:  [facility] 4M POST SURG ORT 1 You were discharged on:  October 9, 2018 Why you were hospitalized Your primary diagnosis was:  Not on File Your diagnoses also included:  Osteoarthritis Of Left Shoulder Follow-up Information Follow up With Details Comments Contact Info MD Ruma Gamezvordustig [address] [place] [address] 
[phone] Discharge Orders None A check sreedhar indicates which time of day the medication should be taken. My Medications START taking these medications Instructions Each Dose to Equal  
 Morning Noon Evening Bedtime  
 aspirin 325 mg tablet Commonly known as:  ASPIRIN Your last dose was:  10/9/2018 
0800am  
Your next dose is:  6pm  
   
 Take 1 Tab by mouth two (2) times daily (with meals) for 14 days. 325 mg  
    
   
   
   
  
 oxyCODONE-acetaminophen 5-325 mg per tablet Commonly known as:  PERCOCET Notes to Patient:  Did not receive pain medication on this admission Take 1 Tab by mouth every four (4) hours as needed for Pain. Max Daily Amount: 6 Tabs. 1 Tab CONTINUE taking these medications Instructions Each Dose to Equal  
 Morning Noon Evening Bedtime  
 lisinopril-hydroCHLOROthiazide 20-25 mg per tablet Commonly known as:  Keyur Fries Your next dose is:  Due at 1100am  
   
 Take  by mouth daily. melatonin Tab tablet Your last dose was: You did not receive on this admission Take  by mouth nightly. omeprazole 20 mg capsule Commonly known as:  PRILOSEC Notes to Patient:  pepcid 20mg given twice/day here Take 20 mg by mouth daily. 20 mg  
    
   
   
   
  
 simvastatin 20 mg tablet Commonly known as:  ZOCOR Notes to Patient:  Did not receive medication on this admission Take  by mouth nightly. STOP taking these medications   
 ibuprofen 200 mg tablet Commonly known as:  MOTRIN Where to Get Your Medications Information on where to get these meds will be given to you by the nurse or doctor. ! Ask your nurse or doctor about these medications  
  aspirin 325 mg tablet  
 oxyCODONE-acetaminophen 5-325 mg per tablet Opioid Education Prescription Opioids: What You Need to Know: 
 
Prescription opioids can be used to help relieve moderate-to-severe pain and are often prescribed following a surgery or injury, or for certain health conditions. These medications can be an important part of treatment but also come with serious risks. Opioids are strong pain medicines. Examples include hydrocodone, oxycodone, fentanyl, and morphine. Heroin is an example of an illegal opioid. It is important to work with your health care provider to make sure you are getting the safest, most effective care. WHAT ARE THE RISKS AND SIDE EFFECTS OF OPIOID USE? Prescription opioids carry serious risks of addiction and overdose, especially with prolonged use. An opioid overdose, often marked by slow breathing, can cause sudden death. The use of prescription opioids can have a number of side effects as well, even when taken as directed. · Tolerance-meaning you might need to take more of a medication for the same pain relief · Physical dependence-meaning you have symptoms of withdrawal when the medication is stopped. Withdrawal symptoms can include nausea, sweating, chills, diarrhea, stomach cramps, and muscle aches. Withdrawal can last up to several weeks, depending on which drug you took and how long you took it. · Increased sensitivity to pain · Constipation · Nausea, vomiting, and dry mouth · Sleepiness and dizziness · Confusion · Depression · Low levels of testosterone that can result in lower sex drive, energy, and strength · Itching and sweating RISKS ARE GREATER WITH:      
· History of drug misuse, substance use disorder, or overdose · Mental health conditions (such as depression or anxiety) · Sleep apnea · Older age (72 years or older) · Pregnancy Avoid alcohol while taking prescription opioids. Also, unless specifically advised by your health care provider, medications to avoid include: · Benzodiazepines (such as Xanax or Valium) · Muscle relaxants (such as Soma or Flexeril) · Hypnotics (such as Ambien or Lunesta) · Other prescription opioids KNOW YOUR OPTIONS Talk to your health care provider about ways to manage your pain that don't involve prescription opioids. Some of these options may actually work better and have fewer risks and side effects. Consult your physician before adding or stopping any medications, treatments, or physical activity. Options may include: 
· Pain relievers such as acetaminophen, ibuprofen, and naproxen · Some medications that are also used for depression or seizures · Physical therapy and exercise · Counseling to help patients learn how to cope better with triggers of pain and stress. · Application of heat or cold compress · Massage therapy · Relaxation techniques Be Informed Make sure you know the name of your medication, how much and how often to take it, and its potential risks & side effects. IF YOU ARE PRESCRIBED OPIOIDS FOR PAIN: 
· Never take opioids in greater amounts or more often than prescribed. Remember the goal is not to be pain-free but to manage your pain at a tolerable level. · Follow up with your primary care provider to: · Work together to create a plan on how to manage your pain.  
· Talk about ways to help manage your pain that don't involve prescription opioids. · Talk about any and all concerns and side effects. · Help prevent misuse and abuse. · Never sell or share prescription opioids · Help prevent misuse and abuse. · Store prescription opioids in a secure place and out of reach of others (this may include visitors, children, friends, and family). · Safely dispose of unused/unwanted prescription opioids: Find your community drug take-back program or your pharmacy mail-back program, or flush them down the toilet, following guidance from the Food and Drug Administration (www.fda.gov/Drugs/ResourcesForYou). · Visit www.cdc.gov/drugoverdose to learn about the risks of opioid abuse and overdose. · If you believe you may be struggling with addiction, tell your health care provider and ask for guidance or call [facility] at 9-536-862-VIGY. Discharge Instructions Shoulder Replacement Surgery Discharge Instructions Dr. Amarjit Willard Please take the time to review the following instructions before you leave the hospital and use them as guidelines during your recovery from surgery. If you have any questions, you may contact my office at [phone]. Wound Care / Dressing Change Do NOT remove your dressing. Keep the clear, plastic dressing intact until your follow up in the office. Ale Gamboa / Nohemi Landau If the clear plastic dressing is intact and there is no drainage, you may shower. If the dressing is loose or water gets under it please notify our office. If there is drainage, please call the office immediately. Sling Keep your arm in the immobilizer/sling at all times except when showering, changing your clothes, and doing the exercises shown to you by Dr. Alanis Lim or your physical/occupational therapist prior to your discharge from the hospital.  Keep your arm at your side when changing your clothes and showering. Do not move it away from your body. Ice and Elevation Ice therapy should be used consistently for 48 hours after surgery. Subsequently, you should ice 3 times per day for 20 minutes at a time. After the first week, you may use ice as needed for pain and swelling. Please ensure there is protective barrier between your skin and the ice. Diet You may advance your regular diet as tolerated. Increase your clear liquid intake for the next 2-3 days. Medications Your prescriptions were sent to the pharmacy on file. We are unable to change the  narcotic prescription that has already been sent today. If you would like to change your pharmacy for FUTURE prescriptions, please call the office. Pharmacy on File: [facility] 1. Pain: You were prescribed a narcotic medication for pain control. Please  use the medication as prescribed. Pain medications may cause constipation  Colace or Milk of Magnesia may be used as needed. Other possible side effects of pain medications are dizziness, headache, nausea, vomiting, and urinary retention. Discontinue the pain medication if you develop itching, rash, shortness of breath, or difficulties swallowing. If these symptoms become severe or arent relieved by discontinuing the medication, you should seek immediate medical attention. You cannot drive or operate machinery while taking narcotic medication. Some pain medications already contain Tylenol/Acetaminophen. Please read your prescription pain medicine bottle prior to taking additional Tylenol. Do not exceed 3000mg of Tylenol/Acetaminophen in a 24 hour period. Refills of pain medication are authorized during office hours only ( Monday to Friday 8am-5pm) 2. Blood Thinner:  You have been given a prescription for Aspirin 325mg. Please take one tablet twice daily for 14 days. Do not take anti-inflammatory medication (Advil, Aleve, Motrin) while taking the blood thinner. 3. Stool Softeners:  Pain medications can cause constipation. Stool softeners, warm prune juice and increasing your water and fiber intake can help prevent constipation. Do not take laxatives. 4. You should resume other medications you were taking prior to your surgery. Pain medication may change the effects of any antidepressant medication you are taking. If you have any questions about possible interactions between your regular medications and the pain medication you should consult the physician who prescribes your regular medications. Physical Therapy: You must begin outpatient physical therapy 2-3 days after your surgery. Your were given a prescription when you scheduled your surgery. If you do not have an appointment scheduled or a therapy prescription please call Dr. Pelon Vaughan' office immediately. APPOINTMENT: Erich 10-11 10:00am 
 
Follow-Up Appointment: 
Please follow up at your scheduled appointment 10-14 days from the day of your surgery. If you do not already have an appointment, please call our office at [phone] for your follow up appointment. Your appointment will likely be with Martine Powell PA-C. Bhargav Muhammad assists Dr. Pelon Vaughan in surgery and will be able to review your operation and answer your questions. APPOINTMENT: 10-22 at 2:30pm 
 
 
Important Signs and Symptoms: 
If any of the following signs and symptoms occurs, you should contact Dr. Yuridia Florentino office. Please be advised if a problem arises which you feel requires immediate medical attention or you are unable to contact Dr. Yuridia Florentino office, you should seek immediate medical attention. Signs and symptoms to watch for include: 1.  A sudden increase in swelling and/or redness or warmth at the area of your surgery which isnt relieved by rest, ice, and elevation. 2. Oral temperature greater than 101degrees for 12 hours or more which isnt relieved by an increase in fluid intake and taking two Tylenol every 4-6 hours. 3. Excessive drainage from your incisions, or drainage which hasnt stopped by 72 hours after your surgery. 4. Calf pain, ever, chills, shortness of breath, chest pain, nausea, vomiting or other signs and symptoms which are of concern to you. Unless you are having a true medical emergency,  
you MUST call Dr. Denise Juarez' office BEFORE proceeding to the Emergency Department. A provider is on call 24 hours/day. Exercises after Shoulder Surgery 1. Passive Forward Flexion: 
                          
Instructions: 
? Lay on your back ? Take your non surgical arm and grab the wrist of your surgical arm ? Use your non surgical arm to lift your surgical arm over your head with the elbow straight ? Slowly return your arm to your side using your non surgical arm ? Repeat 20 times in the morning and 20 times in the evening Remember: 
- Passive motion: Your arm is lifted with the help of your other hand. o The repair is protected when you do passive motion - Active motion: You lift your arm by using your shoulder muscles. o DO NOT DO ANY ACTIVE MOTION FOR NOW 2. Codman Pendulum Exercises Instructions: 
? Bend forward about 90° at the waist and support yourself on a sturdy object with your non surgical arm  (bend slightly at the knees to protect your back) ? Gently allow your surgical arm to hang towards the ground ? Move your hips forward and backward allowing your arm to swing slightly ? Arm can move forward, backward, and in small circles (clockwise and counterclockwise) o Remember: let your body move your arm, do not use your arm muscles ? Begin performing the exercise for about 30 seconds. Progress to 2 minutes. ? Repeat 2-3 times per day Shared [employer] Announcement We are excited to announce that we are making your provider's discharge notes available to you in Shared Spectrum. You will see these notes when they are completed and signed by the physician that discharged you from your recent hospital stay. If you have any questions or concerns about any information you see in Shared Spectrum, please call the Health Information Department where you were seen or reach out to your Primary Care Provider for more information about your plan of care. Introducing [facility]! Alexandre Lopez introduces Shared Spectrum patient portal. Now you can access parts of your medical record, email your doctor's office, and request medication refills online. 1. In your internet browser, go to [email or web address] 2. Click on the First Time User? Click Here link in the Sign In box. You will see the New Member Sign Up page. 3. Enter your Shared Spectrum Access Code exactly as it appears below. You will not need to use this code after youve completed the sign-up process. If you do not sign up before the expiration date, you must request a new code. · Shared Spectrum Access Code: [email or web address] Expires: 12/23/2018 10:20 AM 
 
4. Enter the last four digits of your Social Security Number (xxxx) and Date of Birth (mm/dd/yyyy) as indicated and click Submit. You will be taken to the next sign-up page. 5. Create a Shared Spectrum ID. This will be your Shared Spectrum login ID and cannot be changed, so think of one that is secure and easy to remember. 6. Create a Shared Spectrum password. You can change your password at any time. 7. Enter your Password Reset Question and Answer. This can be used at a later time if you forget your password. 8. Enter your e-mail address. You will receive e-mail notification when new information is available in [address] 9. Click Sign Up. You can now view and download portions of your medical record. 10. Click the Download Summary menu link to download a portable copy of your medical information. If you have questions, please visit the Frequently Asked Questions section of the [employer] website. Remember, PixelPin is NOT to be used for urgent needs. For medical emergencies, dial 911. Now available from your iPhone and Android! Introducing Naif Barker As a [facility] patient, I wanted to make you aware of our electronic visit tool called Naif Barker. Fanvibe/Bukupe allows you to connect within minutes with a medical provider 24 hours a day, seven days a week via a mobile device or tablet or logging into a secure website from your computer. You can access Naif Barker from anywhere in the [place]. A virtual visit might be right for you when you have a simple condition and feel like you just dont want to get out of bed, or cant get away from work for an appointment, when your regular [facility] provider is not available (evenings, weekends or holidays), or when youre out of town and need minor care. Electronic visits cost only $49 and if the [employer]/Buku[employer] provider determines a prescription is needed to treat your condition, one can be electronically transmitted to a nearby pharmacy*. Please take a moment to enroll today if you have not already done so. The enrollment process is free and takes just a few minutes. To enroll, please download the Factery zac to your tablet or phone, or visit [email or web address] to enroll on your computer. And, as an [address] [place] [address] patient with a [employer] account, the results of your visits will be scanned into your electronic medical record and your primary care provider will be able to view the scanned results. We urge you to continue to see your regular [facility] provider for your ongoing medical care.   And while your primary care provider may not be the one available when you seek a Naif Barker virtual visit, the peace of mind you get from getting a real diagnosis real time can be priceless. For more information on Naif Barker, view our Frequently Asked Questions (FAQs) at [email or web address]. Sincerely, 
 
Duglas Kearns MD 
Chief Medical Officer [employer] *:  certain medications cannot be prescribed via Naif Barker Providers Seen During Your Hospitalization Provider Specialty Primary office phone Mica Avendaño MD Orthopedic Surgery [phone] Your Primary Care Physician (PCP) Primary Care Physician Office Phone Office Fax Luis Carlos Josh [phone] You are allergic to the following No active allergies Recent Documentation Height Weight BMI Smoking Status 1.791 m 89.6 kg 27.94 kg/m2 Never Smoker Emergency Contacts Name Discharge Info Relation Home Work Mobile [employer] DISCHARGE CAREGIVER [3] Son [22] [phone] Patient Belongings The following personal items are in your possession at time of discharge: 
  Dental Appliances: None  Visual Aid: Glasses, With patient      Home Medications: None   Jewelry: None  Clothing: Other (comment) (clothing placed in bag and taken to pacu)    Other Valuables: Eyeglasses (glasses given to grandson) Please provide this summary of care documentation to your next provider. Signatures-by signing, you are acknowledging that this After Visit Summary has been reviewed with you and you have received a copy. Patient Signature:  ____________________________________________________________ Date:  ____________________________________________________________  
  
Tom Clay Provider Signature:  ____________________________________________________________ Date:  ____________________________________________________________

## 2018-10-08 NOTE — ANESTHESIA POSTPROCEDURE EVALUATION
Post-Anesthesia Evaluation and Assessment Patient: Martin Mckeon MRN: [id number]  SSN: [id number] YOB: 1945  Age: 74 y.o. Sex: male Cardiovascular Function/Vital Signs Visit Vitals  /82  Pulse 69  Temp 37 °C (98.6 °F)  Resp 16  
 Ht 5' 10.5\" (1.791 m)  Wt 89.6 kg (197 lb 8 oz)  SpO2 96%  BMI 27.94 kg/m2 Patient is status post general, regional anesthesia for Procedure(s): LEFT TOTAL SHOULDER ARTHROPLASTY  (GENERAL WITH BLOCK). Nausea/Vomiting: None Postoperative hydration reviewed and adequate. Pain: 
Pain Scale 1: Numeric (0 - 10) (10/08/18 1439) Pain Intensity 1: 0 (10/08/18 1439) Managed Neurological Status:  
Neuro (WDL): Within Defined Limits (10/08/18 0917) Neuro Neurologic State: Alert (10/08/18 1439) Orientation Level: Oriented X4 (10/08/18 1439) Cognition: Appropriate decision making (10/08/18 1439) Speech: Clear (10/08/18 1439) LUE Motor Response: Numbness (10/08/18 1439) LLE Motor Response: Purposeful (10/08/18 1439) RUE Motor Response: Purposeful (10/08/18 1439) RLE Motor Response: Purposeful (10/08/18 1439) At baseline Mental Status and Level of Consciousness: Arousable Pulmonary Status:  
O2 Device: Room air (10/08/18 1405) Adequate oxygenation and airway patent Complications related to anesthesia: None Post-anesthesia assessment completed. No concerns Signed By: Mac Soler MD   
 October 8, 2018

## 2018-10-08 NOTE — PERIOP NOTES
TRANSFER - OUT REPORT: 
 
Verbal report given to ALESHIA Ruiz on [place]am  being transferred to room 407 for routine post - op Report consisted of patients Situation, Background, Assessment and  
Recommendations(SBAR). Information from the following report(s) SBAR, Kardex, [facility] and Cardiac Rhythm NSR was reviewed with the receiving nurse. Lines:  
Peripheral IV 10/08/18 Right Forearm (Active) Site Assessment Clean, dry, & intact 10/8/2018  2:39 PM  
Phlebitis Assessment 0 10/8/2018  2:39 PM  
Infiltration Assessment 0 10/8/2018  2:39 PM  
Dressing Status Clean, dry, & intact 10/8/2018  2:39 PM  
Dressing Type Transparent 10/8/2018  2:39 PM  
Hub Color/Line Status Green; Infusing;Patent 10/8/2018  2:39 PM  
  
 
Opportunity for questions and clarification was provided.    
 
Patient transported with RN

## 2018-10-08 NOTE — BRIEF OP NOTE
BRIEF OPERATIVE NOTE Date of Procedure: 10/8/2018 Preoperative Diagnosis: LEFT SHOULDER OSTEOARTHRITIS Postoperative Diagnosis: LEFT SHOULDER OSTEOARTHRITIS Procedure: Procedure(s): LEFT TOTAL SHOULDER ARTHROPLASTY  (GENERAL WITH BLOCK) Surgeon: Dionte Abad MD 
Assistant(s): Sherin Uribe PA-C, ATC Anesthesia: General  
Estimated Blood Loss: minimal 
Specimens: * No specimens in log * Findings: oa Complications: None Implants:  
Implant Name Type Inv. Item Serial No.  Lot No. LRB No. Used Action CEMENT BNE SIMPLEX W/GENT -- 10/PK - SN/A  CEMENT BNE SIMPLEX W/GENT -- 10/PK N/A [employer] HOW [id number] Left 1 Implanted 12mm suture button   N/A [employer] [id number] Left 1 Implanted POST SEVERINO HYBRID PT REGENEREX -- COMPREHENSIVE - SN/A  POST SEVERINO HYBRID PT REGENEREX -- COMPREHENSIVE N/A [employer] [id number] Left 1 Implanted BASEPLT SEVERINO HYBRID LG 4MM --  - SN/A  BASEPLT SEVERINO HYBRID LG 4MM --  N/A [employer] [id number] Left 1 Implanted Comprehensive shoulder system mini humeral stem 17mm, 83mm long   N/A [employer] [id number] Left 1 Implanted HEAD HUM VERSA-DIAL STD TAPR --  - SN/A  HEAD HUM VERSA-DIAL STD TAPR --  N/A [employer] [id number] Left 1 Implanted HEAD HUM BPLR [id number] -- VERSA-DIAL - SN/A   HEAD HUM BPLR [id number] -- VERSA-DIAL N/A [employer] [id number] Left 1 Implanted

## 2018-10-08 NOTE — IP AVS SNAPSHOT
[address] 
[phone] Patient: Pradeep Gutiérrez MRN: [id number] TOA:[birth year] A check sreedhar indicates which time of day the medication should be taken. My Medications START taking these medications Instructions Each Dose to Equal  
 Morning Noon Evening Bedtime  
 aspirin 325 mg tablet Commonly known as:  ASPIRIN Your last dose was:  10/9/2018 
0800am  
Your next dose is:  6pm  
   
 Take 1 Tab by mouth two (2) times daily (with meals) for 14 days. 325 mg  
    
   
   
   
  
 oxyCODONE-acetaminophen 5-325 mg per tablet Commonly known as:  PERCOCET Notes to Patient:  Did not receive pain medication on this admission Take 1 Tab by mouth every four (4) hours as needed for Pain. Max Daily Amount: 6 Tabs. 1 Tab CONTINUE taking these medications Instructions Each Dose to Equal  
 Morning Noon Evening Bedtime  
 lisinopril-hydroCHLOROthiazide 20-25 mg per tablet Commonly known as:  Lamond Lovings Your next dose is:  Due at 1100am  
   
 Take  by mouth daily. melatonin Tab tablet Your last dose was: You did not receive on this admission Take  by mouth nightly. omeprazole 20 mg capsule Commonly known as:  PRILOSEC Notes to Patient:  pepcid 20mg given twice/day here Take 20 mg by mouth daily. 20 mg  
    
   
   
   
  
 simvastatin 20 mg tablet Commonly known as:  ZOCOR Notes to Patient:  Did not receive medication on this admission Take  by mouth nightly. STOP taking these medications   
 ibuprofen 200 mg tablet Commonly known as:  MOTRIN Where to Get Your Medications Information on where to get these meds will be given to you by the nurse or doctor. ! Ask your nurse or doctor about these medications aspirin 325 mg tablet  
 oxyCODONE-acetaminophen 5-325 mg per tablet

## 2018-10-08 NOTE — INTERVAL H&P NOTE
H&P Update: 
Barbie Koehler was seen and examined. History and physical has been reviewed. The patient has been examined.  There have been no significant clinical changes since the completion of the originally dated History and Physical. 
 
Signed By: Claudine Crooks MD   
 October 8, 2018 10:44 AM

## 2018-10-08 NOTE — OP NOTES
Date of Procedure: 10/8/2018   Preoperative Diagnosis: LEFT SHOULDER OSTEOARTHRITIS  Postoperative Diagnosis: LEFT SHOULDER OSTEOARTHRITIS , BICEPS TENDONITIS   Procedure: Procedure(s):  LEFT TOTAL SHOULDER ARTHROPLASTY  (GENERAL WITH BLOCK) , BICEPS TENODESIS, AXILLARY NERVE DISSECTION  Surgeon: Mallory Shelton MD  Assistant(s): Chad Escalante PA-C, JOSH  Anesthesia: General   Estimated Blood Loss: 100cc  Specimens: * No specimens in log *   Complications: None  Implants:  Implant Name Type Inv. Item Serial No.  Lot No. LRB No. Used Action   CEMENT BNE SIMPLEX W/GENT -- 10/PK - SN/A  CEMENT BNE SIMPLEX W/GENT -- 10/PK N/A [employer] HOW [id number] Left 1 Implanted   12mm suture button   N/A [employer] [id number] Left 1 Implanted   POST SEVERINO HYBRID PT REGENEREX -- COMPREHENSIVE - SN/A  POST SEVERINO HYBRID PT REGENEREX -- COMPREHENSIVE N/A [employer] [id number] Left 1 Implanted   BASEPLT SEVERINO HYBRID LG 4MM --  - SN/A  BASEPLT SEVERINO HYBRID LG 4MM --  N/A [employer] [id number] Left 1 Implanted   Comprehensive shoulder system mini humeral stem 17mm, 83mm long   N/A [employer] [id number] Left 1 Implanted   HEAD HUM VERSA-DIAL STD TAPR --  - SN/A  HEAD HUM VERSA-DIAL STD TAPR --  N/A [employer] [id number] Left 1 Implanted   HEAD HUM BPLR [id number] -- VERSA-DIAL - SN/A   HEAD HUM BPLR [id number] -- VERSA-DIAL N/A [employer] [id number] Left 1 Implanted         INDICATIONS:  The patient is a patient of mine who has had a long history of left shoulder pain. After failure of  conservative treatment, the patient presents for definitive operative care. DESCRIPTION OF PROCEDURE:  After being informed of the risks and benefits, which include but are not limited to bleeding, infection, neurovascular damage, wound complications, pain and stiffness in the shoulder, periprosthetic loosening, fracture and dislocation the patient consented for the procedure.  After adequate general anesthesia, vishal shoulder was prepped and draped in a sterile fashion. A standard deltopectoral incision was then made, and the cephalic vein was identified and retracted. The subdeltoid and subcoracoid spaces were identified and retracted. The bicipital sheath was then incised, and the biceps tendon was identified and noted to be fairly frayed. We proceeded with a biceps tenodesis. We used #2FiberWire and tenodesed the tendon down to the pectoralis major tendon. The bicipital sheath was then incised and carried proximally through the rotator interval. The rotator interval was then incised to the base of the coracoid. The lesser tuberosity was then drilled, and the lesser tuberosity osteotomy was then performed using an osteotome. We externally rotated the arm, and the anterior-inferior capsule was then debrided off of the neck, allowing the humeral head to dislocate. The anatomic neck was then marked with a bovie then cut with an oscillating saw. We then serially reamed and broached the humerus. This was done to the appropriate size, obtaining excellent chatter and fit of the proximal humerus. At that point we used a calcar planer to finalize the version and the height of the humeral cut. Care was taken to be sure that the supraspinatus fibers inserted directly into the level of the resection, and no excess bone was present. The glenoid was then addressed. A laminar  was inserted, and the axillary nerve was identified as a coursed off of the posterior cord of the brachial plexus. The nerve was followed along the anterior-inferior capsule as it coursed into the posterior subdeltoid space. The nerve was then protected, and then the anterior-inferior capsule was then incised carefully. A complete release was performed all of the way through the posterior capsule. Retractors were placed, and the labrum was excised. Care was taken to remove any glenoid osteophytes with a bur and rongeur. We marked the glenoid, and then the center of the glenoid was drilled. We serially reamed the glenoid, taking care to compensate for any amount of retroversion as identified in the preoperative CT scan. Once the reaming was complete the excess bone was then removed with a rongeur and a bur, and the peripheral peg holes were then drilled. The central large peg was then drilled as well. The glenoid was then irrigated copiously, and hemostasis was obtained with thrombin and epinephrine. The appropriately sized glenoid was cemented into position using 3rd generation cementing techniques. We used a Ruddy syringe to pressurize the cement within the peg holes. All excess cement was debrided, and pressure was held on the implant until the cement was allowed to dry. We then serially trialed the humeral head. Care was taken to obtain the full 180 degrees of forward elevation, 60 degrees of internal rotation in the abducted position, 45 degrees of external rotation in the neutral position with the subscapularis reduced, and 50% subluxation posteriorly with spontaneous reduction. After this was obtained the trials were then removed. A #5 FiberWire was placed through the medial aspect of the osteotomy site, and the appropriate stem was inserted into the humerus. Care was taken to be sure that this medial suture was passed around the stem. We then repaired the lesser tuberosity osteotomy using the backpack technique. We tied the sutures over a button laterally to prevent pullout. The medial sutures were passed through the osteotendinous junction in a mattress fashion and tied down as well. The lateral rotator interval was closed with #2 FiberWire, and the wound was then irrigated copiously with antibiotic irrigation. The deltopectoral interval was then closed. The skin was closed in layers. Sterile dressings were applied and the patient was awakened, taken to the recovery room in stable condition. There were no complications.

## 2018-10-08 NOTE — PROGRESS NOTES
1610 
Patient arrived from PACU accompanied by 2 visitors. No complaint or distress at this time. [address] Bedside and Verbal shift change report given to oncoming nurse. Report included the following information SBAR, Kardex, Intake/Output and Recent Results.

## 2018-10-08 NOTE — H&P (VIEW-ONLY)
PAT Pre-Op History & Physical 
 
Patient: Ziyad Caceres                  MRN: [id number]          SSN: [id number] YOB: 1945          Age: 74 y.o. Sex: male Subjective:  
Patient is a 74 y.o.  male who presents with history of chronic left shoulder pain that has been a problem for \" a couple of years\" per patient report. States that about 6 months ago he was working on a car when he felt a \"pop\" in his left shoulder and states that his pain escalated after that event. Rates his left shoulder pain as high as 8/10 at times and describes it as an intermittent sharp, stabbing pain. Raising his left arm exacerbates his shoulder pain and he states that the pain radiates down into his left upper arm at times. C/o weakness in LUE. Patient is right hand dominant. 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 Has failed steroid joint injections and NSAIDs. The patient was evaluated in the surgeon's office and it was determined that the most appropriate plan of care is to proceed with surgical intervention. Patient's PCP Joo Blanchard MD 
 
Patient states her perforated his right eardrum with a Q tip last week- states he saw PCP and was on antibiotic eardrops for a week but has completed medication. Denies any pain in right ear. Past Medical History:  
Diagnosis Date  Arthritis  GERD (gastroesophageal reflux disease)  High cholesterol  Hypertension  Ill-defined condition 09/24/2018 Overweight  BMI= 27.9  Meniere disease 2008 Resolved in about 2 years  Psychiatric disorder 1970  
 anxiety- no longer on medication  PUD (peptic ulcer disease) 1967 Past Surgical History:  
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  HX ORTHOPAEDIC Right 1948  
 right thumb surgery Prior to Admission medications Medication Sig Start Date End Date Taking? Authorizing Provider  
simvastatin (ZOCOR) 20 mg tablet Take  by mouth nightly. Yes Historical Provider  
lisinopril-hydroCHLOROthiazide (PRINZIDE, ZESTORETIC) 20-25 mg per tablet Take  by mouth daily. Yes Historical Provider  
omeprazole (PRILOSEC) 20 mg capsule Take 20 mg by mouth daily. Yes Historical Provider  
melatonin tab tablet Take  by mouth nightly. Yes Historical Provider  
ibuprofen (MOTRIN) 200 mg tablet Take  by mouth every six (6) hours as needed for Pain. Yes Historical Provider Current Outpatient Prescriptions Medication Sig  
 simvastatin (ZOCOR) 20 mg tablet Take  by mouth nightly.  lisinopril-hydroCHLOROthiazide (PRINZIDE, ZESTORETIC) 20-25 mg per tablet Take  by mouth daily.  omeprazole (PRILOSEC) 20 mg capsule Take 20 mg by mouth daily.  melatonin tab tablet Take  by mouth nightly.  ibuprofen (MOTRIN) 200 mg tablet Take  by mouth every six (6) hours as needed for Pain. No current facility-administered medications for this encounter. No Known Allergies Social History Substance Use Topics  Smoking status: Never Smoker  Smokeless tobacco: Former User Quit date: 9/24/2001 Comment: used to dip  Alcohol use 7.2 oz/week 12 Cans of beer per week Comment: No problem if no ETOH for 5 or more days History Drug Use No  
 
Family History Problem Relation Age of Onset  Parkinson's Disease Mother  No Known Problems Father  No Known Problems Brother Review of Systems Patient denies difficulty swallowing, mouth sores, or loose teeth. Patient denies any recent dental procedures or any planned prior to surgery. Patient denies chest pain, tightness, palpitations.   Denies dizziness, visual disturbances, or lightheadedness. Patient denies shortness of breath, wheezing, cough, fever, or chills. Patient denies diarrhea, constipation, or abdominal pain. Patient denies urinary problems including dysuria, hesitancy, urgency, or incontinence. Denies skin breakdown, rashes, insect bites or open area. C/o left shoulder pain. Objective:  
Patient Vitals for the past 24 hrs: 
 Temp Pulse Resp BP SpO2  
18 1122 98.6 °F (37 °C) 64 20 (!) 174/98 98 % Temp (24hrs), Av.6 °F (37 °C), Min:98.6 °F (37 °C), Max:98.6 °F (37 °C) Body mass index is 27.94 kg/(m^2). Wt Readings from Last 1 Encounters:  
18 89.6 kg (197 lb 8 oz) Physical Exam: 
 
 General: Pleasant,  cooperative, no apparent distress, appears stated age. Eyes: Conjunctivae/corneas clear. EOMs intact. Ears: Rt TM perforated. Lt TM intact- pearly gray. Nose: Nares normal. 
 Mouth/Throat: Lips, mucosa, and tongue normal. Teeth and gums normal. 
 Neck: Supple, symmetrical, trachea midline. Back: Symmetric Lungs: Clear to auscultation bilaterally. Heart: Regular rate and rhythm, S1, S2 normal. No murmur, click, rub or gallop. Abdomen: Soft, non-tender. Bowel sounds normal. No distention. Musculoskeletal:  Left shoulder ROM limited by discomfort Extremities:  Extremities normal, atraumatic, no cyanosis or edema. Calves 
                               supple, non tender to palpation. Pulses: 2+ and symmetric bilateral upper extremities. Cap. refill <2 seconds Skin: Skin color, texture, turgor normal.  No visible rashes or lesions. Neurologic: CN II-XII grossly intact. Alert and oriented x3. Labs:  
Recent Results (from the past 72 hour(s)) CULTURE, MRSA Collection Time: 18 12:12 PM  
Result Value Ref Range Special Requests: NO SPECIAL REQUESTS Culture result: MRSA NOT PRESENT  Culture result:     
      Screening of patient nares for MRSA is for surveillance purposes and, if positive, to facilitate isolation considerations in high risk settings. It is not intended for automatic decolonization interventions per se as regimens are not sufficiently effective to warrant routine use. CBC WITH AUTOMATED DIFF Collection Time: 09/24/18 12:17 PM  
Result Value Ref Range WBC 7.0 4.1 - 11.1 K/uL  
 RBC 4.20 4. 10 - 5.70 M/uL  
 HGB 13.7 12.1 - 17.0 g/dL HCT 39.9 36.6 - 50.3 % MCV 95.0 80.0 - 99.0 FL  
 MCH 32.6 26.0 - 34.0 PG  
 MCHC 34.3 30.0 - 36.5 g/dL  
 RDW 12.6 11.5 - 14.5 % PLATELET 883 436 - 140 K/uL MPV 10.2 8.9 - 12.9 FL  
 NRBC 0.0 0  WBC ABSOLUTE NRBC 0.00 0.00 - 0.01 K/uL NEUTROPHILS 64 32 - 75 % LYMPHOCYTES 22 12 - 49 % MONOCYTES 11 5 - 13 % EOSINOPHILS 1 0 - 7 % BASOPHILS 1 0 - 1 % IMMATURE GRANULOCYTES 1 (H) 0.0 - 0.5 % ABS. NEUTROPHILS 4.4 1.8 - 8.0 K/UL  
 ABS. LYMPHOCYTES 1.6 0.8 - 3.5 K/UL  
 ABS. MONOCYTES 0.8 0.0 - 1.0 K/UL  
 ABS. EOSINOPHILS 0.1 0.0 - 0.4 K/UL  
 ABS. BASOPHILS 0.1 0.0 - 0.1 K/UL  
 ABS. IMM. GRANS. 0.0 0.00 - 0.04 K/UL  
 DF AUTOMATED METABOLIC PANEL, COMPREHENSIVE Collection Time: 09/24/18 12:17 PM  
Result Value Ref Range Sodium 137 136 - 145 mmol/L Potassium 4.0 3.5 - 5.1 mmol/L Chloride 102 97 - 108 mmol/L  
 CO2 25 21 - 32 mmol/L Anion gap 10 5 - 15 mmol/L Glucose 96 65 - 100 mg/dL BUN 10 6 - 20 MG/DL Creatinine 0.81 0.70 - 1.30 MG/DL  
 BUN/Creatinine ratio 12 12 - 20 GFR est AA >60 >60 ml/min/1.73m2 GFR est non-AA >60 >60 ml/min/1.73m2 Calcium 9.1 8.5 - 10.1 MG/DL Bilirubin, total 0.7 0.2 - 1.0 MG/DL  
 ALT (SGPT) 42 12 - 78 U/L  
 AST (SGOT) 25 15 - 37 U/L Alk. phosphatase 75 45 - 117 U/L Protein, total 6.7 6.4 - 8.2 g/dL Albumin 4.2 3.5 - 5.0 g/dL Globulin 2.5 2.0 - 4.0 g/dL A-G Ratio 1.7 1.1 - 2.2 HEMOGLOBIN A1C WITH EAG Collection Time: 09/24/18 12:17 PM  
Result Value Ref Range Hemoglobin A1c 5.9 4.2 - 6.3 % Est. average glucose 123 mg/dL PROTHROMBIN TIME + INR Collection Time: 09/24/18 12:17 PM  
Result Value Ref Range INR 1.0 0.9 - 1.1 Prothrombin time 10.6 9.0 - 11.1 sec PTT Collection Time: 09/24/18 12:17 PM  
Result Value Ref Range aPTT 27.2 22.1 - 32.0 sec  
 aPTT, therapeutic range     58.0 - 77.0 SECS  
TYPE & SCREEN Collection Time: 09/24/18 12:17 PM  
Result Value Ref Range Crossmatch Expiration 09/27/2018 ABO/Rh(D) O POSITIVE Antibody screen NEG   
EKG, 12 LEAD, INITIAL Collection Time: 09/24/18 12:37 PM  
Result Value Ref Range Ventricular Rate 65 BPM  
 Atrial Rate 65 BPM  
 P-R Interval 164 ms QRS Duration 104 ms Q-T Interval 430 ms QTC Calculation (Bezet) 447 ms Calculated P Axis 67 degrees Calculated R Axis 35 degrees Calculated T Axis 58 degrees Diagnosis Normal sinus rhythm RSR' pattern in V1 Borderline ECG No previous ECGs available Confirmed by Felix Bansal MD, [address] ([id number]) on 9/24/2018 2:53:02 PM 
  
URINALYSIS W/ REFLEX CULTURE Collection Time: 09/25/18 11:54 AM  
Result Value Ref Range Color YELLOW/STRAW Appearance CLEAR CLEAR Specific gravity 1.006 1.003 - 1.030    
 pH (UA) 6.5 5.0 - 8.0 Protein NEGATIVE  NEG mg/dL Glucose NEGATIVE  NEG mg/dL Ketone NEGATIVE  NEG mg/dL Bilirubin NEGATIVE  NEG Blood NEGATIVE  NEG Urobilinogen 0.2 0.2 - 1.0 EU/dL Nitrites NEGATIVE  NEG Leukocyte Esterase NEGATIVE  NEG    
 WBC 0-4 0 - 4 /hpf  
 RBC 0-5 0 - 5 /hpf Epithelial cells FEW FEW /lpf Bacteria NEGATIVE  NEG /hpf  
 UA:UC IF INDICATED CULTURE NOT INDICATED BY UA RESULT CNI Hyaline cast 0-2 0 - 5 /lpf Assessment:  
 
Osteoarthritis of left glenohumeral joint Bicep tendinitis of left upper extremity. Perforated right tympanic membrane Plan:  
 
Scheduled for left total shoulder arthroplasty with biceps tenodesis and axillary nerve dissection. Labs and EKG done per surgeon's orders. Lab and EKG results reviewed- unremarkable. MRSA negative. Surgeon's office notified of perforated right eardrum.  
 
 
 
 
Esteban Salinas NP

## 2018-10-08 NOTE — ANESTHESIA PROCEDURE NOTES
Peripheral Block Start time: 10/8/2018 10:57 AM 
End time: 10/8/2018 11:09 AM 
Performed by: Vanessa Obregon Authorized by: Vanessa Obregon Pre-procedure: Indications: at surgeon's request and post-op pain management Preanesthetic Checklist: patient identified, risks and benefits discussed, site marked, timeout performed, anesthesia consent given and patient being monitored Timeout Time: 10:57 Block Type:  
Block Type: Interscalene Laterality:  Left Monitoring:  Continuous pulse ox, frequent vital sign checks, heart rate, responsive to questions and oxygen Injection Technique:  Continuous Procedures: ultrasound guided Patient Position: supine Prep: chlorhexidine Location:  Interscalene Needle Type:  Tuohy Needle Gauge:  18 G Needle Localization:  Nerve stimulator and ultrasound guidance Medication Injected:  0.5% 
ropivacaine Volume (mL):  30 
 
Assessment: 
Number of attempts:  1 Injection Assessment:  Incremental injection every 5 mL, local visualized surrounding nerve on ultrasound, negative aspiration for blood and no intravascular symptoms Patient tolerance:  Patient tolerated the procedure well with no immediate complications

## 2018-10-09 VITALS
TEMPERATURE: 97.6 F | DIASTOLIC BLOOD PRESSURE: 78 MMHG | SYSTOLIC BLOOD PRESSURE: 141 MMHG | OXYGEN SATURATION: 98 % | WEIGHT: 197.5 LBS | BODY MASS INDEX: 27.65 KG/M2 | HEIGHT: 71 IN | RESPIRATION RATE: 14 BRPM | HEART RATE: 67 BPM

## 2018-10-09 LAB — HGB BLD-MCNC: 11.8 G/DL (ref 12.1–17)

## 2018-10-09 PROCEDURE — 97161 PT EVAL LOW COMPLEX 20 MIN: CPT

## 2018-10-09 PROCEDURE — 97110 THERAPEUTIC EXERCISES: CPT

## 2018-10-09 PROCEDURE — 85018 HEMOGLOBIN: CPT | Performed by: ORTHOPAEDIC SURGERY

## 2018-10-09 PROCEDURE — 74011250637 HC RX REV CODE- 250/637: Performed by: STUDENT IN AN ORGANIZED HEALTH CARE EDUCATION/TRAINING PROGRAM

## 2018-10-09 PROCEDURE — 74011000250 HC RX REV CODE- 250: Performed by: ORTHOPAEDIC SURGERY

## 2018-10-09 PROCEDURE — 74011250637 HC RX REV CODE- 250/637: Performed by: ORTHOPAEDIC SURGERY

## 2018-10-09 PROCEDURE — 97165 OT EVAL LOW COMPLEX 30 MIN: CPT

## 2018-10-09 PROCEDURE — 74011250636 HC RX REV CODE- 250/636: Performed by: ORTHOPAEDIC SURGERY

## 2018-10-09 PROCEDURE — 97535 SELF CARE MNGMENT TRAINING: CPT

## 2018-10-09 PROCEDURE — 36415 COLL VENOUS BLD VENIPUNCTURE: CPT | Performed by: ORTHOPAEDIC SURGERY

## 2018-10-09 RX ORDER — OXYCODONE AND ACETAMINOPHEN 5; 325 MG/1; MG/1
1 TABLET ORAL
Qty: 42 TAB | Refills: 0 | Status: SHIPPED
Start: 2018-10-09

## 2018-10-09 RX ORDER — ASPIRIN 325 MG
325 TABLET ORAL 2 TIMES DAILY WITH MEALS
Qty: 28 TAB | Refills: 0 | Status: SHIPPED
Start: 2018-10-09 | End: 2018-10-23

## 2018-10-09 RX ADMIN — LISINOPRIL: 20 TABLET ORAL at 10:42

## 2018-10-09 RX ADMIN — FAMOTIDINE 20 MG: 20 TABLET ORAL at 08:06

## 2018-10-09 RX ADMIN — Medication 2 G: at 04:31

## 2018-10-09 RX ADMIN — POLYETHYLENE GLYCOL 3350 17 G: 17 POWDER, FOR SOLUTION ORAL at 08:05

## 2018-10-09 RX ADMIN — Medication 10 ML: at 06:10

## 2018-10-09 RX ADMIN — Medication 2 G: at 10:42

## 2018-10-09 RX ADMIN — ACETAMINOPHEN 650 MG: 325 TABLET ORAL at 06:10

## 2018-10-09 RX ADMIN — ASPIRIN 325 MG: 325 TABLET, DELAYED RELEASE ORAL at 08:06

## 2018-10-09 RX ADMIN — SENNOSIDES AND DOCUSATE SODIUM 1 TABLET: 8.6; 5 TABLET ORAL at 08:06

## 2018-10-09 NOTE — PROGRESS NOTES
physical Therapy EVALUATION/DISCHARGE Patient: Kb Armas (89 y.o. male) Date: 10/9/2018 Primary Diagnosis: LEFT SHOULDER OSTEOARTHRITIS Osteoarthritis of left shoulder Procedure(s) (LRB): LEFT TOTAL SHOULDER ARTHROPLASTY  (GENERAL WITH BLOCK) (Left) 1 Day Post-Op Precautions:   NWB (LUE) ASSESSMENT : 
Based on the objective data described below, the patient presents with functional strength and ROM to BLE, and safety with balance, transfers, gait and steps following admission for left TSA. Patient was received up in chair with sling in place. He stood and ambulated 100 feet and went up and down 4 steps with CGA. He is steady on his feet with no loss of balance noted. O2 sats stable ranging from 93-99% and HR 73-78. He lives alone but family is next door and able to assist. He is safe for discharge from a PT standpoint. Skilled physical therapy is not indicated at this time. PLAN : 
Discharge Recommendations: Outpatient Further Equipment Recommendations for Discharge: none SUBJECTIVE:  
Patient stated I feel good. I have not had one bit of pain.  OBJECTIVE DATA SUMMARY:  
HISTORY:   
Past Medical History:  
Diagnosis Date  Arthritis  GERD (gastroesophageal reflux disease)  High cholesterol  Hypertension  Ill-defined condition 09/24/2018 Overweight  BMI= 27.9  Meniere disease 2008 Resolved in about 2 years  Psychiatric disorder 1970  
 anxiety- no longer on medication  PUD (peptic ulcer disease) 1967 Past Surgical History:  
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  HX ORTHOPAEDIC Right 1948  
 right thumb surgery Prior Level of Function/Home Situation: independent Personal factors and/or comorbidities impacting plan of care: lives alone but good family support Home Situation Home Environment: (P) Private residence # Steps to Enter: (P) 4 Rails to Enter: Yes Hand Rails : Bilateral 
One/Two Story Residence: (P) Two story, live on 1st floor (1 step into den; 3 steps into kitchen) Living Alone: (P) Yes Support Systems: (P) Family member(s) Patient Expects to be Discharged to[de-identified] (P) Private residence Current DME Used/Available at Home: (P) Blood pressure cuff Tub or Shower Type: (P) Tub/Shower combination EXAMINATION/PRESENTATION/DECISION MAKING:  
Critical Behavior: 
Neurologic State: (P) Alert Orientation Level: (P) Oriented X4 Cognition: (P) Appropriate decision making, Appropriate for age attention/concentration, Appropriate safety awareness Safety/Judgement: Good awareness of safety precautions Hearing: Auditory Auditory Impairment: None Range Of Motion: 
  
 Functional to BLE Strength:   
  
 Functional to BLE Tone & Sensation:  
  
  
  
 Functional to BLE Coordination: 
  
Vision:  
Tracking: (P) Able to track stimulus in all quadrants w/o difficulty Diplopia: (P) No 
Acuity: (P) Within Defined Limits Functional Mobility: 
Bed Mobility: 
  
 Received up in chair Transfers: 
Sit to Stand: Stand-by assistance Stand to Sit: Stand-by assistance Balance:  
Sitting: Intact Standing: Intact Ambulation/Gait Training: 
Distance (ft): 100 Feet (ft) Assistive Device: Gait belt;Orthotic device Ambulation - Level of Assistance: Stand-by assistance Base of Support: Widened Speed/Estephania: Pace decreased (<100 feet/min) Stairs: 
Number of Stairs Trained: 4 Stairs - Level of Assistance: Contact guard assistance Rail Use: Right Functional Measure: 
Barthel Index: 
 
Bathin Bladder: 10 Bowels: 10 
Groomin Dressin Feeding: 10 Mobility: 10 Stairs: 10 Toilet Use: 10 Transfer (Bed to Chair and Back): 10 Total: 80 Barthel and G-code impairment scale: 
Percentage of impairment CH 
0% CI 
1-19% CJ 
20-39% CK 
40-59% CL 
60-79% CM 
80-99% CN 
100% Barthel Score 0-100 100 99-80 79-60 59-40 20-39 1-19 
 0 Barthel Score 0-20 20 17-19 13-16 9-12 5-8 1-4 0 The Barthel ADL Index: Guidelines 1. The index should be used as a record of what a patient does, not as a record of what a patient could do. 2. The main aim is to establish degree of independence from any help, physical or verbal, however minor and for whatever reason. 3. The need for supervision renders the patient not independent. 4. A patient's performance should be established using the best available evidence. Asking the patient, friends/relatives and nurses are the usual sources, but direct observation and common sense are also important. However direct testing is not needed. 5. Usually the patient's performance over the preceding 24-48 hours is important, but occasionally longer periods will be relevant. 6. Middle categories imply that the patient supplies over 50 per cent of the effort. 7. Use of aids to be independent is allowed. Deandre Burgess., Barthel, D.W. ([id number]). Functional evaluation: the Barthel Index. [facility] (14)2. Chad  leo MILES Yates, [facility]., [facility].[address] (). Measuring the change indisability after inpatient rehabilitation; comparison of the responsiveness of the Barthel Index and Functional [place] Measure. Journal of Neurology, Neurosurgery, and Psychiatry, 66(4), 354-358. Pantera Tello, N.[place].A, BERTRAND Ramsay, & Jaziel Bianchi, M.A. (2004.) Assessment of post-stroke quality of life in cost-effectiveness studies: The usefulness of the Barthel Index and the EuroQoL-5D. [facility], 13, [phone] Physical Therapy Evaluation Charge Determination History Examination Presentation Decision-Making LOW Complexity : Zero comorbidities / personal factors that will impact the outcome / POC LOW Complexity : 1-2 Standardized tests and measures addressing body structure, function, activity limitation and / or participation in recreation  LOW Complexity : Stable, uncomplicated  Other outcome measures Barthel  LOW Based on the above components, the patient evaluation is determined to be of the following complexity level: LOW Pain: 
Pain Scale 1: Numeric (0 - 10) Pain Intensity 1: 0 Activity Tolerance:  
good Please refer to the flowsheet for vital signs taken during this treatment. After treatment:  
[x]   Patient left in no apparent distress sitting up in chair 
[]   Patient left in no apparent distress in bed 
[x]   Call bell left within reach [x]   Nursing notified 
[]   Caregiver present 
[]   Bed alarm activated COMMUNICATION/EDUCATION:  
Communication/Collaboration: 
[x]   Fall prevention education was provided and the patient/caregiver indicated understanding. [x]   Patient/family have participated as able and agree with findings and recommendations. []   Patient is unable to participate in plan of care at this time. Findings and recommendations were discussed with: Occupational Therapist and Registered Nurse Thank you for this referral. 
Martin Garcia, PT Time Calculation: 15 mins

## 2018-10-09 NOTE — DISCHARGE INSTRUCTIONS
Shoulder Replacement Surgery Discharge Instructions  Dr. Sarah Wheeler    Please take the time to review the following instructions before you leave the hospital and use them as guidelines during your recovery from surgery. If you have any questions, you may contact my office at [phone]. Wound Care / Dressing Change    Do NOT remove your dressing. Keep the clear, plastic dressing intact until your follow up in the office. Showering / Bathing    If the clear plastic dressing is intact and there is no drainage, you may shower. If the dressing is loose or water gets under it please notify our office. If there is drainage, please call the office immediately. Sling    Keep your arm in the immobilizer/sling at all times except when showering, changing your clothes, and doing the exercises shown to you by Dr. Gen Chavis or your physical/occupational therapist prior to your discharge from the hospital.  Keep your arm at your side when changing your clothes and showering. Do not move it away from your body. Ice and Elevation    Ice therapy should be used consistently for 48 hours after surgery. Subsequently, you should ice 3 times per day for 20 minutes at a time. After the first week, you may use ice as needed for pain and swelling. Please ensure there is protective barrier between your skin and the ice. Diet    You may advance your regular diet as tolerated. Increase your clear liquid intake for the next 2-3 days. Medications  Your prescriptions were sent to the pharmacy on file. We are unable to change the  narcotic prescription that has already been sent today. If you would like to change your pharmacy for FUTURE prescriptions, please call the office. Pharmacy on File: [facility]    1. Pain: You were prescribed a narcotic medication for pain control. Please  use the medication as prescribed.   Pain medications may cause constipation - Colace or Milk of Magnesia may be used as needed. Other possible side effects of pain medications are dizziness, headache, nausea, vomiting, and urinary retention. Discontinue the pain medication if you develop itching, rash, shortness of breath, or difficulties swallowing. If these symptoms become severe or arent relieved by discontinuing the medication, you should seek immediate medical attention. You cannot drive or operate machinery while taking narcotic medication. Some pain medications already contain Tylenol/Acetaminophen. Please read your prescription pain medicine bottle prior to taking additional Tylenol. Do not exceed 3000mg of Tylenol/Acetaminophen in a 24 hour period. Refills of pain medication are authorized during office hours only   ( Monday to Friday 8am-5pm)        2. Blood Thinner:  You have been given a prescription for Aspirin 325mg. Please take one tablet twice daily for 14 days. Do not take anti-inflammatory medication (Advil, Aleve, Motrin) while taking the blood thinner. 3. Stool Softeners:  Pain medications can cause constipation. Stool softeners, warm prune juice and increasing your water and fiber intake can help prevent constipation. Do not take laxatives. 4. You should resume other medications you were taking prior to your surgery. Pain medication may change the effects of any antidepressant medication you are taking. If you have any questions about possible interactions between your regular medications and the pain medication you should consult the physician who prescribes your regular medications. Physical Therapy:  You must begin outpatient physical therapy 2-3 days after your surgery. Your were given a prescription when you scheduled your surgery. If you do not have an appointment scheduled or a therapy prescription please call Dr. Pelon Vaughan' office immediately.      APPOINTMENT: Savana Winston 10-11 10:00am    Follow-Up Appointment:  Please follow up at your scheduled appointment 10-14 days from the day of your surgery. If you do not already have an appointment, please call our office at [phone] for your follow up appointment. Your appointment will likely be with Ameena Olivarez PA-C. [facility] assists Dr. Rogerio Ricks in surgery and will be able to review your operation and answer your questions. APPOINTMENT: 10-22 at 2:30pm      Important Signs and Symptoms:  If any of the following signs and symptoms occurs, you should contact Dr. Lisa Meckel office. Please be advised if a problem arises which you feel requires immediate medical attention or you are unable to contact Dr. Lisa Meckel office, you should seek immediate medical attention. Signs and symptoms to watch for include:    1. A sudden increase in swelling and/or redness or warmth at the area of your surgery which isnt relieved by rest, ice, and elevation. 2. Oral temperature greater than 101degrees for 12 hours or more which isnt relieved by an increase in fluid intake and taking two Tylenol every 4-6 hours. 3. Excessive drainage from your incisions, or drainage which hasnt stopped by 72 hours after your surgery. 4. Calf pain, ever, chills, shortness of breath, chest pain, nausea, vomiting or other signs and symptoms which are of concern to you. Unless you are having a true medical emergency,   you MUST call Dr. Rogerio Ricks' office   BEFORE proceeding to the Emergency Department. A provider is on call 24 hours/day. Exercises after Shoulder Surgery  1.  Passive Forward Flexion:                             Instructions:  - Lay on your back  - Take your non surgical arm and grab the wrist of your surgical arm   - Use your non surgical arm to lift your surgical arm over your head with the elbow straight   - Slowly return your arm to your side using your non surgical arm  - Repeat 20 times in the morning and 20 times in the evening  Remember:  - Passive motion: Your arm is lifted with the help of your other hand. o The repair is protected when you do passive motion  - Active motion: You lift your arm by using your shoulder muscles. o DO NOT DO ANY ACTIVE MOTION FOR NOW    2. Codman Pendulum Exercises                                         Instructions:  - Bend forward about 90° at the waist and support yourself on a sturdy object with your non surgical arm  (bend slightly at the knees to protect your back)  - Gently allow your surgical arm to hang towards the ground  - Move your hips forward and backward allowing your arm to swing slightly  - Arm can move forward, backward, and in small circles (clockwise and counterclockwise)  o Remember: let your body move your arm, do not use your arm muscles  - Begin performing the exercise for about 30 seconds. Progress to 2 minutes.   - Repeat 2-3 times per day

## 2018-10-09 NOTE — CONSULTS
[address] [facility] [address] [place] [address]   Office [phone]  Fax [phone]       Initial Consult Note     Name: Phuong Pompa MRN: [id number]  Sex: male    YOB: 1945  Age: 74 y.o. PCP: Ernestina Montoya MD     Date of admission:    10/8/2018  Date of consultation:   10/9/2018  Requesting physician:   Samaria Monreal MD  Reason for consultation:   Medical Management    History of present illness  Phuong Pompa is a 74 y.o. 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 Family medicine is consulted for medical management. Pt denies pain, well controlled. Pt denies fever/chills, n/v, cp, sob, abdominal pain, urinary symptoms. Wants to eat. No BM yet. Home Medications   Prior to Admission medications    Medication Sig Start Date End Date Taking? Authorizing Provider   oxyCODONE-acetaminophen (PERCOCET) 5-325 mg per tablet Take 1 Tab by mouth every four (4) hours as needed for Pain. Max Daily Amount: 6 Tabs. 10/9/18  Yes Samaria Monreal MD   aspirin (ASPIRIN) 325 mg tablet Take 1 Tab by mouth two (2) times daily (with meals) for 14 days. 10/9/18 10/23/18 Yes Samaria Monreal MD   lisinopril-hydroCHLOROthiazide (PRINZIDE, ZESTORETIC) 20-25 mg per tablet Take  by mouth daily. Yes Historical Provider   omeprazole (PRILOSEC) 20 mg capsule Take 20 mg by mouth daily. Yes Historical Provider   melatonin tab tablet Take  by mouth nightly. Yes Historical Provider   ibuprofen (MOTRIN) 200 mg tablet Take  by mouth every six (6) hours as needed for Pain. Yes Historical Provider   simvastatin (ZOCOR) 20 mg tablet Take  by mouth nightly.     Historical Provider       Allergies   No Known Allergies    Past Medical History   Past Medical History:   Diagnosis Date    Arthritis     GERD (gastroesophageal reflux disease)     High cholesterol     Hypertension     Ill-defined condition 09/24/2018    Overweight BMI= 27.9    Meniere disease 2008    Resolved in about 2 years    Psychiatric disorder 1970    anxiety- no longer on medication    PUD (peptic ulcer disease) 1967       Past Surgical History  Past Surgical History:   Procedure Laterality Date    HX BACK SURGERY  2013    L3-L4 surgery    HX KNEE ARTHROSCOPY Right 2016    HX ORTHOPAEDIC Left 2003    Surgery on great toe due to injury in 1970's    HX ORTHOPAEDIC Left 1996    Carpal tunnel release and 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  HX ORTHOPAEDIC Right 1948    right thumb surgery       Family History  Family History   Problem Relation Age of Onset    Parkinson's Disease Mother     No Known Problems Father     No Known Problems Brother        Social History   Living arrangements: patient lives with their son. Ambulates: Independently     Alcohol history: 2 alcohol per day    Smoking history: chewed tobacco, quit 1999    Illicit drug history: no history of illicit drug use    Code status: Full     Review of Systems  Review of Systems   Constitutional: Negative for activity change, appetite change, chills and fever. HENT: Negative for congestion, ear pain and sneezing. Respiratory: Negative for cough, chest tightness, shortness of breath and wheezing. Gastrointestinal: Negative for abdominal distention, abdominal pain, constipation and diarrhea. Endocrine: Negative for cold intolerance, heat intolerance and polydipsia. Genitourinary: Negative for difficulty urinating, dysuria and flank pain. Musculoskeletal: Positive for arthralgias. Negative for back pain. Neurological: Negative for dizziness, syncope and light-headedness. Psychiatric/Behavioral: Negative for agitation and confusion. The patient is not nervous/anxious. Physical Exam  Objective:  General Appearance:  Comfortable. Vital signs: (most recent): Blood pressure 141/78, pulse 67, temperature 97.6 °F (36.4 °C), resp.  rate 14, height 5' 10.5\" (1.791 m), weight 197 lb 8 oz (89.6 kg), SpO2 98 %. Vital signs are normal.  No fever. Output: Producing urine. Lungs:  Normal respiratory rate and normal effort. Heart: Normal rate. Regular rhythm. S1 normal and S2 normal.    Extremities: Normal range of motion. (Except to left shoulder (immobilized), able to wiggle fingers)  Neurological: Patient is alert and oriented to person, place and time. Skin:  Warm and dry. No rash or ecchymosis. Abdomen: Abdomen is soft and non-distended. There is no abdominal tenderness. There is no mass. Pupils:  Pupils are equal, round, and reactive to light. Pulses: Distal pulses are intact. O2 Device: Room air     Laboratory Data  Recent Results (from the past 8 hour(s))   HEMOGLOBIN    Collection Time: 10/09/18  4:28 AM   Result Value Ref Range    HGB 11.8 (L) 12.1 - 17.0 g/dL     EKG 9/24/18:  NSR HR 65,  qtc 447, RSR' on V1. Verified by Dr Shikha Sol / Liudmila Segundo is a 74 y.o. 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 The Family Medicine Service was consulted for medical management. 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 - Per primary team    HTN. Normotensive.   - Resume Prinizide 20-25mg every day on 10/9    PUD/GERD. 2 colonoscopy in the past per pt. No records. - On pepcid per primary   - May resume Priolsec 20mg every day on discharge    HLD. No Lipid panel in records. - May resume zocor 20mg every day  - F/u with PCP    Insomnia  - Melatonin prn    FEN/GI - Per primary team.   Activity - Per primary team  DVT prophylaxis - Per primary team  GI prophylaxis - Not indicated at this time  Disposition - Plan to d/c to Per primary team.  Code Status - Full, discussed with patient / caregivers.      Thank you very much for allowing us to participate in the care of this pleasant patient. The family medicine service will continue to follow the patient's medical progress along with you. Please do not hesitate to page with any questions or to discuss the case (pager # [phone]). Patient will be discussed with Dr. Ayad Ambrocio (attending physican). Signed by:     Antwon Obando MD  Family Medicine Resident        For Billing    No chief complaint on file.       Hospital Problems  Never Reviewed          Codes Class Noted POA    Osteoarthritis of left shoulder ICD-10-CM: M19.012  ICD-9-CM: 715.91  10/8/2018 Unknown

## 2018-10-09 NOTE — PROGRESS NOTES
Problem: Self Care Deficits Care Plan (Adult) Goal: *Acute Goals and Plan of Care (Insert Text) Occupational Therapy Goals Initiated 10/9/2018 1. Patient will don/doff arm sling at modified independence level within 7 days. 2.  Patient will perform all UE exercises per physician order with modified independence within 7 days. 3.  Patient will verbalize/demonstrate shoulder precautions during ADLs with 100% accuracy within 7 days. Occupational Therapy EVALUATION Patient: Genevieve Elliott (77 y.o. male) Date: 10/9/2018 Primary Diagnosis: LEFT SHOULDER OSTEOARTHRITIS Osteoarthritis of left shoulder Procedure(s) (LRB): LEFT TOTAL SHOULDER ARTHROPLASTY  (GENERAL WITH BLOCK) (Left) 1 Day Post-Op Precautions:fall,shoulder/ NWB + sling at all times L UE 
 
ASSESSMENT : 
Based on the objective data described below, the patient presents with good overall activity tolerance on POD 1 of L TSA. Patient lives alone but next door to his son and daughter-in-law and confirms his family will assist PRN when discharging home. Patient demonstrated good understanding of education provided regarding shoulder precautions, adaptive ADL techniques, sling application, ice pack application + wear schedule, safe transfer techniques. Patient required supervision for bed mobility stand by assist for OOB transfers. Patient required increased time (mod I) for feeding, grooming, and toileting, min A for UB dressing, and supervision/setup for LB bathing and dressing. Patient was educated on L UE exercises per MD protocol and issued handout to ensure carryover at home; He tolerated exercises well. Patient is cleared for discharge from OT services at this time but will be seen BID for continued exercise program while in the hospital setting. Patient will benefit from skilled intervention to address the above impairments. Patients rehabilitation potential is considered to be Good Factors which may influence rehabilitation potential include:  
[x]             None noted []             Mental ability/status []             Medical condition []             Home/family situation and support systems []             Safety awareness []             Pain tolerance/management 
[]             Other: PLAN : 
Recommendations and Planned Interventions: 
[x]               Self Care Training                  [x]        Therapeutic Activities [x]               Functional Mobility Training    []        Cognitive Retraining 
[x]               Therapeutic Exercises           [x]        Endurance Activities []               Balance Training                   []        Neuromuscular Re-Education []               Visual/Perceptual Training     [x]   Home Safety Training 
[x]               Patient Education                 [x]        Family Training/Education []               Other (comment): Frequency/Duration: Patient will be followed by occupational therapy BID to address goals however cleared for discharged from OT services at this time Discharge Recommendations: outpatient PT per MD recommendation Further Equipment Recommendations for Discharge: none SUBJECTIVE:  
Patient stated Kaitlin Ryder will have more than enough help at home.  OBJECTIVE DATA SUMMARY:  
HISTORY:  
Past Medical History:  
Diagnosis Date  Arthritis  GERD (gastroesophageal reflux disease)  High cholesterol  Hypertension  Ill-defined condition 09/24/2018 Overweight  BMI= 27.9  Meniere disease 2008 Resolved in about 2 years  Psychiatric disorder 1970  
 anxiety- no longer on medication  PUD (peptic ulcer disease) 1967 Past Surgical History:  
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  HX ORTHOPAEDIC Right 1948  
 right thumb surgery Prior Level of Function/Environment/Context: Patient lives alone with family living next door. Patient was active and independent with all care PTA. Home Situation Home Environment: Private residence # Steps to Enter: 4 Rails to Enter: Yes Hand Rails : Bilateral 
One/Two Story Residence: Two story, live on 1st floor (1 step into den; 3 steps into kitchen) Living Alone: Yes Support Systems: Family member(s) Patient Expects to be Discharged to[de-identified] Private residence Current DME Used/Available at Home: Blood pressure cuff Tub or Shower Type: Tub/Shower combination Hand dominance: Right EXAMINATION OF PERFORMANCE DEFICITS: 
Cognitive/Behavioral Status: 
Neurologic State: Alert Orientation Level: Oriented X4 Cognition: Appropriate decision making; Appropriate for age attention/concentration; Appropriate safety awareness Perception: Appears intact Perseveration: No perseveration noted Safety/Judgement: Good awareness of safety precautions Skin: Intact in the uppers Edema: None noted in the uppers Hearing: Auditory Auditory Impairment: None Vision/Perceptual:   
Tracking: Able to track stimulus in all quadrants w/o difficulty Diplopia: No   
Acuity: Within Defined Limits Range of Motion: 
Right upper: WDL Left upper: Shoulder not tested s/p TSA; OnQ impacting active movement at [facility] Strength: 
Right upper: WDL Left upper: Shoulder not tested s/p TSA; OnQ impacting active movement at [facility] Coordination: 
Fine Motor Skills-Upper: Left Intact; Right Intact Gross Motor Skills-Upper: Left Intact; Right Intact Tone & Sensation: 
Tone: Normal 
Sensation: Impaired throughout L UE-OnQ intact Balance: 
Sitting: Intact Standing: Intact Functional Mobility and Transfers for ADLs: 
Bed Mobility: 
Rolling: Supervision Supine to Sit: Supervision Sit to Supine:  (pt remained up at end of tx) Scooting: Supervision Transfers: 
Sit to Stand: Stand-by assistance Stand to Sit: Stand-by assistance Bed to Chair: Stand-by assistance Bathroom Mobility: Stand-by assistance ADL Assessment: 
Feeding: Modified independent Oral Facial Hygiene/Grooming: Modified Independent Bathing: Supervision;Setup Upper Body Dressing: Minimum assistance; Patient educated on adaptive technique for upper body dressing. Patient instructed to dress L upper extremity, pull shirt around back and work R upper extremity through sleeve. Patient demonstrated understanding and required min assist to complete task. Lower Body Dressing: Supervision;Setup; Patient instructed to don all clothing while sitting prior to standing, doff all clothing to knees while standing, then sit to doff clothing off from knees to feet in order to facilitate fall prevention, pain management, and energy conservation with ADLS. Patient indicated understanding/recalled strategies with min instruction. Toileting: Modified independent Cognitive Retraining Safety/Judgement: Good awareness of safety precautions Therapeutic Exercise: (Days 1 to 3) Exercises: 
 
 
EXERCISE Sets Reps Active Active Assist  
Passive Comments Elbow Flexion/ extesnsion 3 10 []           [x]           []            3x day Wrist flexion/ extension 3 10 []           [x]           []            3x day Hand flexion/ extension 3 10 []           [x]           []            3x day Supine passive forward elevation  1 5 []           []           [x]            2x day; plane of scapula (PFE) to 90 degrees; Supine PFE by family member or using opposite arm Codman's pendulum 1 10 []           []           [x]            clockwise/counter clockwise C-spine AROM 1 10 [x]           []           []            rotation, flexion, + lateral flexion Cautions: - Assure normal neurovascular status - No lifting of involved arm - Shoulder extension is limited; Elbow not to go behind midline of body - Protect the subscapularis repair Goals: 
-Maintain stable prosthesis -Minimize pain and inflammatory response 
-Achieve staged ROM goals 
-Establish stable scapula 
-Initiate pain free rotator cuff and deltoid strengthening Functional Measure: 
Barthel Index: 
 
Bathin Bladder: 10 Bowels: 10 
Groomin Dressin Feeding: 10 Mobility: 10 Stairs: 10 Toilet Use: 10 Transfer (Bed to Chair and Back): 10 Total: 80 Barthel and G-code impairment scale: 
Percentage of impairment CH 
0% CI 
1-19% CJ 
20-39% CK 
40-59% CL 
60-79% CM 
80-99% CN 
100% Barthel Score 0-100 100 99-80 79-60 59-40 20-39 1-19 
 0 Barthel Score 0-20 20 17-19 13-16 9-12 5-8 1-4 0 The Barthel ADL Index: Guidelines 1. The index should be used as a record of what a patient does, not as a record of what a patient could do. 2. The main aim is to establish degree of independence from any help, physical or verbal, however minor and for whatever reason. 3. The need for supervision renders the patient not independent. 4. A patient's performance should be established using the best available evidence. Asking the patient, friends/relatives and nurses are the usual sources, but direct observation and common sense are also important. However direct testing is not needed. 5. Usually the patient's performance over the preceding 24-48 hours is important, but occasionally longer periods will be relevant. 6. Middle categories imply that the patient supplies over 50 per cent of the effort. 7. Use of aids to be independent is allowed. Álvaro Dan., Barthel, D.W. (6596). Functional evaluation: the Barthel Index. [facility] (14)2. MILES Cavazos, Praneeth Oscar., Carlos Villareal., Sanjana [place], [address] [place] [address] ().  Measuring the change indisability after inpatient rehabilitation; comparison of the responsiveness of the Barthel Index and Functional [place] Measure. Journal of Neurology, Neurosurgery, and Psychiatry, 66(4), 561-937. [place] MARCUS Garcia, BERTRAND Ramsay, & Chapito Combs M.A. (2004.) Assessment of post-stroke quality of life in cost-effectiveness studies: The usefulness of the Barthel Index and the EuroQoL-5D. [facility], 13, 734-44 G codes: In compliance with CMSs Claims Based Outcome Reporting, the following G-code set was chosen for this patient based on their primary functional limitation being treated: The outcome measure chosen to determine the severity of the functional limitation was the Barthel Index with a score of 80/100 which was correlated with the impairment scale. ? Self Care:  
  - CURRENT STATUS: CI - 1%-19% impaired, limited or restricted  - GOAL STATUS: CH - 0% impaired, limited or restricted  - D/C STATUS:  ---------------To be determined--------------- Occupational Therapy Evaluation Charge Determination History Examination Decision-Making LOW Complexity : Brief history review  LOW Complexity : 1-3 performance deficits relating to physical, cognitive , or psychosocial skils that result in activity limitations and / or participation restrictions  LOW Complexity : No comorbidities that affect functional and no verbal or physical assistance needed to complete eval tasks Based on the above components, the patient evaluation is determined to be of the following complexity level: LOW Pain: 
Pain Scale 1: Numeric (0 - 10) Pain Intensity 1: 0 Activity Tolerance:  
Patient tolerated eval well. Please refer to the flowsheet for vital signs taken during this treatment. After treatment:  
[x] Patient left in no apparent distress sitting up in chair 
[] Patient left in no apparent distress in bed 
[x] Call bell left within reach [x] Nursing notified 
[] Caregiver present [] Bed alarm activated COMMUNICATION/EDUCATION:  
The patients plan of care was discussed with: Physical Therapist, Registered Nurse and patient. Joon Gonzalez [x] Home safety education was provided and the patient/caregiver indicated understanding. [x] Patient/family have participated as able in goal setting and plan of care. [x] Patient/family agree to work toward stated goals and plan of care. [] Patient understands intent and goals of therapy, but is neutral about his/her participation. [] Patient is unable to participate in goal setting and plan of care. This patients plan of care is appropriate for delegation to [facility] [place]. Thank you for this referral. 
Louis Rolon, OTR/L Time Calculation: 54 mins

## 2018-10-09 NOTE — PROGRESS NOTES
Alerted from unit secretary regarding consult for medical management at [phone]. Per chart review, appears patient has already received discharge orders from primary team. Will verify consult with nursing.

## 2018-10-09 NOTE — PROGRESS NOTES
Name:  Megan Garcia Age:  74 y.o. MRN:  [id number] CSN:  [id number] :  1945 
 
 
10/9/2018 
11:02 AM 
 
Anesthesia Peripheral Nerve Catheter Post-Op Rounds Note Daily Management of Continuous Peripheral Nerve/Plexus Catheter Referring physician: Sujata Catalan MD  
Patient status post Procedure(s): LEFT TOTAL SHOULDER ARTHROPLASTY  (GENERAL WITH BLOCK) on 10/8/2018 POST OP Day # 1 Visit Vitals  /78 (BP 1 Location: Right arm, BP Patient Position: At rest;Sitting)  Pulse 67  Temp 36.4 °C (97.6 °F)  Resp 14  
 Ht 5' 10.5\" (1.791 m)  Wt 89.6 kg (197 lb 8 oz)  SpO2 98%  BMI 27.94 kg/m2 Patient rates pain 0 at rest and 0 with movement. Pain is subjectively rated by patient as none. Pain location: Pt denies pain or SOB. Able to wiggle fingers and wrist without difficulty. For discharge if cleared by PT. Peripheral Nerve Catheter ball functioning, site is clean, dry, and intact. No complications, adequate analgesia from peripheral nerve catheter. Continue current postop pain regimen.    
 
Kristen Ortiz RN

## 2018-10-09 NOTE — DISCHARGE SUMMARY
Total Shoulder Arthoplasty Discharge Summary    Patient ID:  Anitra Boogie  1945  74 y.o.  [id number]    Admit date: 10/8/2018    Discharge date and time: No discharge date for patient encounter. Admitting Physician: Nael Amrstrong MD     Admission Diagnoses: LEFT SHOULDER OSTEOARTHRITIS  Osteoarthritis of left shoulder    Discharge Diagnoses: Active Problems:    Osteoarthritis of left shoulder (10/8/2018)        HISTORY OF PRESENT ILLNESS:  Anitra Boogie is a pleasant 74 y.o. long standing patient with advanced osteoarthritis of the shoulder. The patient failed all conservative management. Therefore, Dr. Rogerio Ricks and the patient decided the most appropriate plan of care is to proceed with total shoulder arthroplasty, to be preformed at [facility]. All preoperative clearance was done prior to surgery. The patient's complete history and physical, laboratory data and physical exam is well documented in hospital chart. HOSPITAL COURSE:  Anitra Boogie was admitted on10/8/2018 and underwent successful total shoulder arthroplasty. There were no complications intraoperatively and the patient was transferred to the orthopaedic floor in stable condition. A consultations was made to a primary care physician whom continued to monitor the patient throughout the patient's hospitalizations. Physical therapy and occupational therapy initiated their evaluation and treatment and continued to follow the patient until the patient was discharged. For pain control, an interscalene nerve block was used, and was discontinued on post-operative day 2. The patient then was transitioned over oral narcotics in which was tolerated well. The incision site remained clean, dry, and intact. The patient remained neurovascularly intact.  At the time of discharge, the patient was able to ambulate safely, go up and down stairs and had an understanding of the explicit discharge precautions and instructions following surgery. Post Op complications: none    Current Discharge Medication List      START taking these medications    Details   oxyCODONE-acetaminophen (PERCOCET) 5-325 mg per tablet Take 1 Tab by mouth every four (4) hours as needed for Pain. Max Daily Amount: 6 Tabs. Qty: 42 Tab, Refills: 0    Associated Diagnoses: Primary osteoarthritis of left shoulder      aspirin (ASPIRIN) 325 mg tablet Take 1 Tab by mouth two (2) times daily (with meals) for 14 days. Qty: 28 Tab, Refills: 0         CONTINUE these medications which have NOT CHANGED    Details   lisinopril-hydroCHLOROthiazide (PRINZIDE, ZESTORETIC) 20-25 mg per tablet Take  by mouth daily. omeprazole (PRILOSEC) 20 mg capsule Take 20 mg by mouth daily. melatonin tab tablet Take  by mouth nightly. simvastatin (ZOCOR) 20 mg tablet Take  by mouth nightly. STOP taking these medications       ibuprofen (MOTRIN) 200 mg tablet Comments:   Reason for Stopping:               Discharged to: Home    Discharge instructions:  -Resume pre hospital diet.            -Resume home medications per medication reconciliation form. -Prescriptions for pain medication were provided to the patient.     -Attend physical therapy as directed by the physician.  -Patient to wear sling for at least 4 weeks.  -Ambulate with an assisted device as instructed by PT/OT prior to discharge.   -First follow-up appointment to be scheduled in 10 days. -If patient is unaware of their appointment time/date, they are call Dr. Denisse Schmitt office at [phone].  -Explicit discharge instructions were provided to the patient.     Jovan Savage MD

## 2018-10-09 NOTE — PROGRESS NOTES
Problem: Falls - Risk of 
Goal: *Absence of Falls Document Deep [place]berg Fall Risk and appropriate interventions in the flowsheet. Outcome: Resolved/Met Date Met: 10/09/18 Fall Risk Interventions: 
  
 
  
 
Medication Interventions: Patient to call before getting OOB Up ambulating w/o difficulty.

## 2018-10-09 NOTE — PROGRESS NOTES
Ortho Progress Note Patient: Bethany Renee MRN: [id number]  SSN: [id number] YOB: 1945  Age: 74 y.o. Sex: male POD:    1 Day Post-Op S/P:    Procedure(s): LEFT TOTAL SHOULDER ARTHROPLASTY  (GENERAL WITH BLOCK) Subjective:  
 
Bethany Renee is resting in bed with the appropriate level of discomfort. The patient denies complaints of dyspnea or chest pain. Objective Objective:  
Patient Vitals for the past 12 hrs: 
 BP Temp Pulse Resp SpO2  
10/09/18 0344 118/61 97.6 °F (36.4 °C) 61 16 98 % 10/09/18 0015 133/66 98.2 °F (36.8 °C) 62 14 94 % 10/08/18 2005 143/76 97.9 °F (36.6 °C) 75 16 96 % Recent Labs 10/09/18 
 9158 HGB  11.8* Patient is alert and oriented x 3 in NAD. The operative shoulder dressing is clean, dry, and intact. The interscalene block catheter is intact without drainage. The patient is neurovascularly intact. The sling and ice are properly positioned on the operative arm. Assessment: 
 
 Assessment:  
Status post shoulder arthroplasty Plan:  
1. We will continue the current pain management regimen. 2. Physical therapy and occupational therapy will initiate treatment. 3. The patient will be discharged home if medically stable,  the pain level is well controlled and they have been cleared by physical therapy. 4.  Bethany Renee will be discharged home with the peripheral nerve catheter intact. Explicit discharge instructions have been provided to the patient for removal of the nerve block as well post operative instructions and prescriptions. 5. The patient is to follow-up in the office at the scheduled appointment in 10-14 days.

## 2018-10-09 NOTE — PROGRESS NOTES
Patient for discharge, went over to discharge lounge to await arrival of son, went over discharge instructions with patient and prescriptions given. Follow-up care  with physician, pt to make appointment. Patient understood instructions and patient in no apparent distress.

## 2018-10-09 NOTE — PROGRESS NOTES
10/9/2018 
9:59 AM 
Reason for Admission:    Elective - left shoulder RRAT Score:          8 Plan for utilizing home health:      Outpatient PT recommended. Likelihood of Readmission:  Daniel Weaver Transition of Care Plan:                   
 
CM met with pt for assessment. Demographics and PCP were confirmed. Pt is a 74year old,  male who lives in a private residence alone (4 exterior steps, 1 flight of interior steps with bedroom downstairs and full bathroom upstairs). Pt's son lives next door. PTA, pt was able to complete ADLs without the use of DME. Pt has prescription drug coverage, and prefers [employer] in [place]. Pt is aware of his scheduled outpatient PT appt (see AVS). No additional discharge service needs indicated. Pt's son will provide transport. CM will continue to monitor for finalized discharge recommendations. Fina Malhotra MA Care Management Interventions PCP Verified by CM: Yes ([place] - No NN to notify) Palliative Care Criteria Met (RRAT>21 & CHF Dx)?: No 
Mode of Transport at Discharge: Other (see comment) (Son) Transition of Care Consult (CM Consult): Discharge Planning MyChart Signup: No 
Discharge Durable Medical Equipment: No 
Physical Therapy Consult: Yes Occupational Therapy Consult: Yes Speech Therapy Consult: No 
Current Support Network: Lives Alone, Family Lives [place] Confirm Follow Up Transport: Family Plan discussed with Pt/Family/Caregiver: Yes Freedom of Choice Offered: Yes Discharge Location Discharge Placement: Home with outpatient services

## 2018-10-09 NOTE — PROGRESS NOTES
Bedside shift change report given to Jewell Velez RN (oncoming nurse) by Navarro Marquez RN (offgoing nurse). Report included the following information SBAR, Kardex and MAR.

## 2020-01-14 ENCOUNTER — OP HISTORICAL/CONVERTED ENCOUNTER (OUTPATIENT)
Dept: OTHER | Age: 75
End: 2020-01-14

## 2022-03-19 PROBLEM — M19.012 OSTEOARTHRITIS OF LEFT SHOULDER: Status: ACTIVE | Noted: 2018-10-08

## 2023-07-27 ENCOUNTER — ANESTHESIA EVENT (OUTPATIENT)
Facility: HOSPITAL | Age: 78
End: 2023-07-27
Payer: MEDICARE

## 2023-07-31 NOTE — H&P
Charo Branch was referred for evaluation by:Dr. Eugenio Jade for Pre- Op Evaluation. Please see encounter details and orders for consultative summary. Type of surgery : Right Total Shoulder Arthroplasty with Biceps Tenodesis and Axillary Nerve Dissection  Surgery site : Right shoulder  Anesthesia type: General with [facility] Nerve block  Date of procedure:  8/14/2023    This 78y.o. year old male presents with complaints of right shoulder pain for the past several years that has significantly worsened over the past 6 months. Conservative measures including medication management and joint injection have been exhausted prior to the decision for surgery. Patient has discussed the risks, alternatives, and benefits of the surgery with surgeon and has elected to proceed with surgical intervention. Blood pressure extremely high in PAT; reports that he sees cardiology with [facility] and recent changes were made to his medication regimen. Denies headache, vision changes, shortness of breath, chest pain. Allergies: Allergies   Allergen Reactions    Atorvastatin Itching     Latex allergy: no  Prior reactions to anesthesia:  None     Current Outpatient Medications   Medication Sig    lisinopril (PRINIVIL;ZESTRIL) 30 MG tablet Take 1 tablet by mouth every morning    hydroCHLOROthiazide (HYDRODIURIL) 25 MG tablet Take 1 tablet by mouth every morning    atenolol (TENORMIN) 25 MG tablet Take 1 tablet by mouth every morning    simvastatin (ZOCOR) 20 MG tablet Take 1 tablet by mouth nightly    Multiple Vitamins-Minerals (CENTRUM SILVER PO) Take 1 tablet by mouth every morning    aspirin 81 MG EC tablet Take 1 tablet by mouth every morning    MELOXICAM PO Take 15 mg by mouth daily as needed    methocarbamol (ROBAXIN) 750 MG tablet Take 1 tablet by mouth 3 times daily as needed (muscle spasm)     No current facility-administered medications for this encounter.      Past Medical History:   Diagnosis

## 2023-08-01 ENCOUNTER — HOSPITAL ENCOUNTER (OUTPATIENT)
Facility: HOSPITAL | Age: 78
Discharge: HOME | End: 2023-08-04
Payer: MEDICARE

## 2023-08-01 ENCOUNTER — HOSPITAL ENCOUNTER (EMERGENCY)
Facility: HOSPITAL | Age: 78
Discharge: HOME | End: 2023-08-01
Attending: EMERGENCY MEDICINE
Payer: MEDICARE

## 2023-08-01 VITALS
BODY MASS INDEX: 27.5 KG/M2 | HEART RATE: 53 BPM | DIASTOLIC BLOOD PRESSURE: 99 MMHG | SYSTOLIC BLOOD PRESSURE: 202 MMHG | HEIGHT: 71 IN | RESPIRATION RATE: 14 BRPM | TEMPERATURE: 97.2 F | WEIGHT: 196.4 LBS | OXYGEN SATURATION: 97 %

## 2023-08-01 VITALS
SYSTOLIC BLOOD PRESSURE: 183 MMHG | BODY MASS INDEX: 28 KG/M2 | HEART RATE: 56 BPM | OXYGEN SATURATION: 98 % | RESPIRATION RATE: 18 BRPM | WEIGHT: 200 LBS | TEMPERATURE: 97.4 F | HEIGHT: 71 IN | DIASTOLIC BLOOD PRESSURE: 96 MMHG

## 2023-08-01 DIAGNOSIS — I10 HYPERTENSION, UNSPECIFIED TYPE: Primary | ICD-10-CM

## 2023-08-01 LAB
ABO + RH BLD: NORMAL
ALBUMIN SERPL-MCNC: 4.4 G/DL (ref 3.5–5)
ALBUMIN/GLOB SERPL: 1.7 (ref 1.1–2.2)
ALP SERPL-CCNC: 77 U/L (ref 45–117)
ALT SERPL-CCNC: 38 U/L (ref 12–78)
ANION GAP SERPL CALC-SCNC: 4 MMOL/L (ref 5–15)
APPEARANCE UR: CLEAR
APTT PPP: 25.7 SEC (ref 22.1–31)
AST SERPL-CCNC: 27 U/L (ref 15–37)
BACTERIA URNS QL MICRO: NEGATIVE /HPF
BASOPHILS # BLD: 0.1 K/UL (ref 0–0.1)
BASOPHILS NFR BLD: 1 % (ref 0–1)
BILIRUB SERPL-MCNC: 0.6 MG/DL (ref 0.2–1)
BILIRUB UR QL: NEGATIVE
BLOOD GROUP ANTIBODIES SERPL: NORMAL
BUN SERPL-MCNC: 17 MG/DL (ref 6–20)
BUN/CREAT SERPL: 18 (ref 12–20)
CALCIUM SERPL-MCNC: 9.4 MG/DL (ref 8.5–10.1)
CHLORIDE SERPL-SCNC: 103 MMOL/L (ref 97–108)
CO2 SERPL-SCNC: 30 MMOL/L (ref 21–32)
COLOR UR: NORMAL
COMMENT:: NORMAL
CREAT SERPL-MCNC: 0.94 MG/DL (ref 0.7–1.3)
DIFFERENTIAL METHOD BLD: ABNORMAL
EOSINOPHIL # BLD: 0.2 K/UL (ref 0–0.4)
EOSINOPHIL NFR BLD: 2 % (ref 0–7)
EPITH CASTS URNS QL MICRO: NORMAL /LPF
ERYTHROCYTE [DISTWIDTH] IN BLOOD BY AUTOMATED COUNT: 13.4 % (ref 11.5–14.5)
EST. AVERAGE GLUCOSE BLD GHB EST-MCNC: 117 MG/DL
GLOBULIN SER CALC-MCNC: 2.6 G/DL (ref 2–4)
GLUCOSE SERPL-MCNC: 114 MG/DL (ref 65–100)
GLUCOSE UR STRIP.AUTO-MCNC: NEGATIVE MG/DL
HBA1C MFR BLD: 5.7 % (ref 4–5.6)
HCT VFR BLD AUTO: 44.3 % (ref 36.6–50.3)
HGB BLD-MCNC: 14.4 G/DL (ref 12.1–17)
HGB UR QL STRIP: NEGATIVE
HYALINE CASTS URNS QL MICRO: NORMAL /LPF (ref 0–2)
IMM GRANULOCYTES # BLD AUTO: 0.1 K/UL (ref 0–0.04)
IMM GRANULOCYTES NFR BLD AUTO: 1 % (ref 0–0.5)
INR PPP: 1.1 (ref 0.9–1.1)
KETONES UR QL STRIP.AUTO: NEGATIVE MG/DL
LEUKOCYTE ESTERASE UR QL STRIP.AUTO: NEGATIVE
LYMPHOCYTES # BLD: 2.3 K/UL (ref 0.8–3.5)
LYMPHOCYTES NFR BLD: 24 % (ref 12–49)
MCH RBC QN AUTO: 32.5 PG (ref 26–34)
MCHC RBC AUTO-ENTMCNC: 32.5 G/DL (ref 30–36.5)
MCV RBC AUTO: 100 FL (ref 80–99)
MONOCYTES # BLD: 1 K/UL (ref 0–1)
MONOCYTES NFR BLD: 10 % (ref 5–13)
NEUTS SEG # BLD: 6 K/UL (ref 1.8–8)
NEUTS SEG NFR BLD: 62 % (ref 32–75)
NITRITE UR QL STRIP.AUTO: NEGATIVE
NRBC # BLD: 0 K/UL (ref 0–0.01)
NRBC BLD-RTO: 0 PER 100 WBC
PH UR STRIP: 7.5 (ref 5–8)
PLATELET # BLD AUTO: 225 K/UL (ref 150–400)
PMV BLD AUTO: 11.5 FL (ref 8.9–12.9)
POTASSIUM SERPL-SCNC: 4.5 MMOL/L (ref 3.5–5.1)
PROT SERPL-MCNC: 7 G/DL (ref 6.4–8.2)
PROT UR STRIP-MCNC: NEGATIVE MG/DL
PROTHROMBIN TIME: 11.3 SEC (ref 9–11.1)
RBC # BLD AUTO: 4.43 M/UL (ref 4.1–5.7)
RBC #/AREA URNS HPF: NORMAL /HPF (ref 0–5)
SODIUM SERPL-SCNC: 137 MMOL/L (ref 136–145)
SP GR UR REFRACTOMETRY: 1.01 (ref 1–1.03)
SPECIMEN EXP DATE BLD: NORMAL
SPECIMEN HOLD: NORMAL
THERAPEUTIC RANGE: NORMAL SECS (ref 58–77)
URINE CULTURE IF INDICATED: NORMAL
UROBILINOGEN UR QL STRIP.AUTO: 1 EU/DL (ref 0.2–1)
WBC # BLD AUTO: 9.6 K/UL (ref 4.1–11.1)
WBC URNS QL MICRO: NORMAL /HPF (ref 0–4)

## 2023-08-01 PROCEDURE — 83036 HEMOGLOBIN GLYCOSYLATED A1C: CPT

## 2023-08-01 PROCEDURE — 86900 BLOOD TYPING SEROLOGIC ABO: CPT

## 2023-08-01 PROCEDURE — 85730 THROMBOPLASTIN TIME PARTIAL: CPT

## 2023-08-01 PROCEDURE — 81001 URINALYSIS AUTO W/SCOPE: CPT

## 2023-08-01 PROCEDURE — 80053 COMPREHEN METABOLIC PANEL: CPT

## 2023-08-01 PROCEDURE — 85610 PROTHROMBIN TIME: CPT

## 2023-08-01 PROCEDURE — 99283 EMERGENCY DEPT VISIT LOW MDM: CPT

## 2023-08-01 PROCEDURE — 86850 RBC ANTIBODY SCREEN: CPT

## 2023-08-01 PROCEDURE — 86901 BLOOD TYPING SEROLOGIC RH(D): CPT

## 2023-08-01 PROCEDURE — 36415 COLL VENOUS BLD VENIPUNCTURE: CPT

## 2023-08-01 PROCEDURE — 85025 COMPLETE CBC W/AUTO DIFF WBC: CPT

## 2023-08-01 RX ORDER — LISINOPRIL 30 MG/1
30 TABLET ORAL EVERY MORNING
COMMUNITY

## 2023-08-01 RX ORDER — HYDROCHLOROTHIAZIDE 25 MG/1
25 TABLET ORAL EVERY MORNING
COMMUNITY

## 2023-08-01 RX ORDER — SIMVASTATIN 20 MG
20 TABLET ORAL NIGHTLY
COMMUNITY

## 2023-08-01 RX ORDER — ATENOLOL 25 MG/1
25 TABLET ORAL EVERY MORNING
COMMUNITY

## 2023-08-01 RX ORDER — METHOCARBAMOL 750 MG/1
750 TABLET, FILM COATED ORAL 3 TIMES DAILY PRN
COMMUNITY

## 2023-08-01 RX ORDER — ASPIRIN 81 MG/1
81 TABLET ORAL EVERY MORNING
COMMUNITY

## 2023-08-01 ASSESSMENT — ENCOUNTER SYMPTOMS
WHEEZING: 0
SHORTNESS OF BREATH: 1
ABDOMINAL PAIN: 0
COUGH: 0
CHEST TIGHTNESS: 0
SHORTNESS OF BREATH: 0
SINUS PAIN: 0
SORE THROAT: 0
BLOOD IN STOOL: 0
TROUBLE SWALLOWING: 0
VOMITING: 0
NAUSEA: 0

## 2023-08-01 ASSESSMENT — PAIN - FUNCTIONAL ASSESSMENT: PAIN_FUNCTIONAL_ASSESSMENT: NONE - DENIES PAIN

## 2023-08-01 NOTE — DISCHARGE INSTRUCTIONS
Please call your cardiologist regarding your elevated blood pressure, advise them that your kidney function was normal

## 2023-08-01 NOTE — ED TRIAGE NOTES
Patient  was sent in by preop testing. Went there for preop evaluation for shoulder surgery.  was told his blood pressure was too high and to come to the ER. Patient has a history of hypertension, states just a couple of weeks ago his cardiologist increased his lisinopril and cut down his atenolol. Patient denies symptoms, including chest pain.

## 2023-08-02 LAB
BACTERIA SPEC CULT: NORMAL
BACTERIA SPEC CULT: NORMAL
SERVICE CMNT-IMP: NORMAL

## 2023-08-14 ENCOUNTER — HOSPITAL ENCOUNTER (OUTPATIENT)
Facility: HOSPITAL | Age: 78
Setting detail: OUTPATIENT SURGERY
Discharge: HOME | End: 2023-08-14
Attending: ORTHOPAEDIC SURGERY | Admitting: ORTHOPAEDIC SURGERY
Payer: MEDICARE

## 2023-08-14 ENCOUNTER — APPOINTMENT (OUTPATIENT)
Facility: HOSPITAL | Age: 78
End: 2023-08-14
Attending: ORTHOPAEDIC SURGERY
Payer: MEDICARE

## 2023-08-14 ENCOUNTER — ANESTHESIA (OUTPATIENT)
Facility: HOSPITAL | Age: 78
End: 2023-08-14
Payer: MEDICARE

## 2023-08-14 VITALS
HEART RATE: 47 BPM | RESPIRATION RATE: 12 BRPM | TEMPERATURE: 96.8 F | DIASTOLIC BLOOD PRESSURE: 48 MMHG | OXYGEN SATURATION: 100 % | SYSTOLIC BLOOD PRESSURE: 103 MMHG

## 2023-08-14 DIAGNOSIS — M19.012 PRIMARY OSTEOARTHRITIS OF LEFT SHOULDER: Primary | ICD-10-CM

## 2023-08-14 PROCEDURE — 6360000002 HC RX W HCPCS: Performed by: STUDENT IN AN ORGANIZED HEALTH CARE EDUCATION/TRAINING PROGRAM

## 2023-08-14 PROCEDURE — C1776 JOINT DEVICE (IMPLANTABLE): HCPCS | Performed by: ORTHOPAEDIC SURGERY

## 2023-08-14 PROCEDURE — 2580000003 HC RX 258: Performed by: ORTHOPAEDIC SURGERY

## 2023-08-14 PROCEDURE — 3600000015 HC SURGERY LEVEL 5 ADDTL 15MIN: Performed by: ORTHOPAEDIC SURGERY

## 2023-08-14 PROCEDURE — 2500000003 HC RX 250 WO HCPCS: Performed by: NURSE ANESTHETIST, CERTIFIED REGISTERED

## 2023-08-14 PROCEDURE — 7100000010 HC PHASE II RECOVERY - FIRST 15 MIN: Performed by: ORTHOPAEDIC SURGERY

## 2023-08-14 PROCEDURE — 6360000002 HC RX W HCPCS: Performed by: ORTHOPAEDIC SURGERY

## 2023-08-14 PROCEDURE — C9290 INJ, BUPIVACAINE LIPOSOME: HCPCS | Performed by: STUDENT IN AN ORGANIZED HEALTH CARE EDUCATION/TRAINING PROGRAM

## 2023-08-14 PROCEDURE — 2709999900 HC NON-CHARGEABLE SUPPLY: Performed by: ORTHOPAEDIC SURGERY

## 2023-08-14 PROCEDURE — 64415 NJX AA&/STRD BRCH PLXS IMG: CPT | Performed by: STUDENT IN AN ORGANIZED HEALTH CARE EDUCATION/TRAINING PROGRAM

## 2023-08-14 PROCEDURE — 6360000002 HC RX W HCPCS: Performed by: NURSE ANESTHETIST, CERTIFIED REGISTERED

## 2023-08-14 PROCEDURE — 73020 X-RAY EXAM OF SHOULDER: CPT

## 2023-08-14 PROCEDURE — 3600000005 HC SURGERY LEVEL 5 BASE: Performed by: ORTHOPAEDIC SURGERY

## 2023-08-14 PROCEDURE — 2580000003 HC RX 258: Performed by: STUDENT IN AN ORGANIZED HEALTH CARE EDUCATION/TRAINING PROGRAM

## 2023-08-14 PROCEDURE — 7100000011 HC PHASE II RECOVERY - ADDTL 15 MIN: Performed by: ORTHOPAEDIC SURGERY

## 2023-08-14 PROCEDURE — 7100000000 HC PACU RECOVERY - FIRST 15 MIN: Performed by: ORTHOPAEDIC SURGERY

## 2023-08-14 PROCEDURE — 3700000000 HC ANESTHESIA ATTENDED CARE: Performed by: ORTHOPAEDIC SURGERY

## 2023-08-14 PROCEDURE — 2580000003 HC RX 258: Performed by: NURSE ANESTHETIST, CERTIFIED REGISTERED

## 2023-08-14 PROCEDURE — 7100000001 HC PACU RECOVERY - ADDTL 15 MIN: Performed by: ORTHOPAEDIC SURGERY

## 2023-08-14 PROCEDURE — 3700000001 HC ADD 15 MINUTES (ANESTHESIA): Performed by: ORTHOPAEDIC SURGERY

## 2023-08-14 PROCEDURE — C9399 UNCLASSIFIED DRUGS OR BIOLOG: HCPCS | Performed by: NURSE ANESTHETIST, CERTIFIED REGISTERED

## 2023-08-14 RX ORDER — ASPIRIN 81 MG/1
81 TABLET ORAL
Qty: 28 TABLET | Refills: 0
Start: 2023-08-14 | End: 2023-08-28

## 2023-08-14 RX ORDER — TAMSULOSIN HYDROCHLORIDE 0.4 MG/1
0.4 CAPSULE ORAL DAILY
Qty: 3 CAPSULE | Refills: 0
Start: 2023-08-14 | End: 2023-08-17

## 2023-08-14 RX ORDER — SODIUM CHLORIDE, SODIUM LACTATE, POTASSIUM CHLORIDE, CALCIUM CHLORIDE 600; 310; 30; 20 MG/100ML; MG/100ML; MG/100ML; MG/100ML
INJECTION, SOLUTION INTRAVENOUS CONTINUOUS
Status: DISCONTINUED | OUTPATIENT
Start: 2023-08-14 | End: 2023-08-14 | Stop reason: HOSPADM

## 2023-08-14 RX ORDER — PHENYLEPHRINE HYDROCHLORIDE 10 MG/ML
INJECTION INTRAVENOUS PRN
Status: DISCONTINUED | OUTPATIENT
Start: 2023-08-14 | End: 2023-08-14 | Stop reason: SDUPTHER

## 2023-08-14 RX ORDER — ONDANSETRON 2 MG/ML
4 INJECTION INTRAMUSCULAR; INTRAVENOUS
Status: DISCONTINUED | OUTPATIENT
Start: 2023-08-14 | End: 2023-08-14 | Stop reason: HOSPADM

## 2023-08-14 RX ORDER — LIDOCAINE HYDROCHLORIDE 10 MG/ML
1 INJECTION, SOLUTION EPIDURAL; INFILTRATION; INTRACAUDAL; PERINEURAL
Status: DISCONTINUED | OUTPATIENT
Start: 2023-08-14 | End: 2023-08-14 | Stop reason: HOSPADM

## 2023-08-14 RX ORDER — FENTANYL CITRATE 50 UG/ML
100 INJECTION, SOLUTION INTRAMUSCULAR; INTRAVENOUS
Status: DISCONTINUED | OUTPATIENT
Start: 2023-08-14 | End: 2023-08-14 | Stop reason: HOSPADM

## 2023-08-14 RX ORDER — HYDROCODONE BITARTRATE AND ACETAMINOPHEN 5; 325 MG/1; MG/1
1 TABLET ORAL EVERY 4 HOURS PRN
Qty: 42 TABLET | Refills: 0
Start: 2023-08-14 | End: 2023-08-21

## 2023-08-14 RX ORDER — DIPHENHYDRAMINE HYDROCHLORIDE 50 MG/ML
12.5 INJECTION INTRAMUSCULAR; INTRAVENOUS
Status: DISCONTINUED | OUTPATIENT
Start: 2023-08-14 | End: 2023-08-14 | Stop reason: HOSPADM

## 2023-08-14 RX ORDER — BUPIVACAINE HYDROCHLORIDE 5 MG/ML
INJECTION, SOLUTION EPIDURAL; INTRACAUDAL
Status: COMPLETED | OUTPATIENT
Start: 2023-08-14 | End: 2023-08-14

## 2023-08-14 RX ORDER — SUCCINYLCHOLINE/SOD CL,ISO/PF 100 MG/5ML
SYRINGE (ML) INTRAVENOUS PRN
Status: DISCONTINUED | OUTPATIENT
Start: 2023-08-14 | End: 2023-08-14 | Stop reason: SDUPTHER

## 2023-08-14 RX ORDER — PROPOFOL 10 MG/ML
INJECTION, EMULSION INTRAVENOUS PRN
Status: DISCONTINUED | OUTPATIENT
Start: 2023-08-14 | End: 2023-08-14 | Stop reason: SDUPTHER

## 2023-08-14 RX ORDER — FENTANYL CITRATE 50 UG/ML
INJECTION, SOLUTION INTRAMUSCULAR; INTRAVENOUS PRN
Status: DISCONTINUED | OUTPATIENT
Start: 2023-08-14 | End: 2023-08-14 | Stop reason: SDUPTHER

## 2023-08-14 RX ORDER — CEPHALEXIN 500 MG/1
500 CAPSULE ORAL 4 TIMES DAILY
Qty: 12 CAPSULE | Refills: 0
Start: 2023-08-14 | End: 2023-08-17

## 2023-08-14 RX ORDER — VANCOMYCIN HYDROCHLORIDE 1 G/20ML
INJECTION, POWDER, LYOPHILIZED, FOR SOLUTION INTRAVENOUS PRN
Status: DISCONTINUED | OUTPATIENT
Start: 2023-08-14 | End: 2023-08-14 | Stop reason: ALTCHOICE

## 2023-08-14 RX ORDER — ROCURONIUM BROMIDE 10 MG/ML
INJECTION, SOLUTION INTRAVENOUS PRN
Status: DISCONTINUED | OUTPATIENT
Start: 2023-08-14 | End: 2023-08-14 | Stop reason: SDUPTHER

## 2023-08-14 RX ORDER — GLYCOPYRROLATE 0.2 MG/ML
INJECTION INTRAMUSCULAR; INTRAVENOUS PRN
Status: DISCONTINUED | OUTPATIENT
Start: 2023-08-14 | End: 2023-08-14 | Stop reason: SDUPTHER

## 2023-08-14 RX ORDER — ONDANSETRON 2 MG/ML
INJECTION INTRAMUSCULAR; INTRAVENOUS PRN
Status: DISCONTINUED | OUTPATIENT
Start: 2023-08-14 | End: 2023-08-14 | Stop reason: SDUPTHER

## 2023-08-14 RX ORDER — MIDAZOLAM HYDROCHLORIDE 2 MG/2ML
2 INJECTION, SOLUTION INTRAMUSCULAR; INTRAVENOUS
Status: DISCONTINUED | OUTPATIENT
Start: 2023-08-14 | End: 2023-08-14 | Stop reason: HOSPADM

## 2023-08-14 RX ORDER — LIDOCAINE HYDROCHLORIDE 20 MG/ML
INJECTION, SOLUTION EPIDURAL; INFILTRATION; INTRACAUDAL; PERINEURAL PRN
Status: DISCONTINUED | OUTPATIENT
Start: 2023-08-14 | End: 2023-08-14 | Stop reason: SDUPTHER

## 2023-08-14 RX ADMIN — PHENYLEPHRINE HYDROCHLORIDE 100 MCG: 10 INJECTION INTRAVENOUS at 10:07

## 2023-08-14 RX ADMIN — PHENYLEPHRINE HYDROCHLORIDE 100 MCG: 10 INJECTION INTRAVENOUS at 09:36

## 2023-08-14 RX ADMIN — PHENYLEPHRINE HYDROCHLORIDE 100 MCG: 10 INJECTION INTRAVENOUS at 09:52

## 2023-08-14 RX ADMIN — PHENYLEPHRINE HYDROCHLORIDE 100 MCG: 10 INJECTION INTRAVENOUS at 08:31

## 2023-08-14 RX ADMIN — PHENYLEPHRINE HYDROCHLORIDE 50 MCG/MIN: 10 INJECTION INTRAVENOUS at 08:34

## 2023-08-14 RX ADMIN — ONDANSETRON 4 MG: 2 INJECTION INTRAMUSCULAR; INTRAVENOUS at 10:17

## 2023-08-14 RX ADMIN — PROPOFOL 50 MG: 10 INJECTION, EMULSION INTRAVENOUS at 08:46

## 2023-08-14 RX ADMIN — BUPIVACAINE 10 ML: 13.3 INJECTION, SUSPENSION, LIPOSOMAL INFILTRATION at 08:10

## 2023-08-14 RX ADMIN — FENTANYL CITRATE 75 MCG: 50 INJECTION, SOLUTION INTRAMUSCULAR; INTRAVENOUS at 08:10

## 2023-08-14 RX ADMIN — SUGAMMADEX 200 MG: 100 INJECTION, SOLUTION INTRAVENOUS at 10:36

## 2023-08-14 RX ADMIN — PHENYLEPHRINE HYDROCHLORIDE 100 MCG: 10 INJECTION INTRAVENOUS at 10:31

## 2023-08-14 RX ADMIN — SODIUM CHLORIDE, POTASSIUM CHLORIDE, SODIUM LACTATE AND CALCIUM CHLORIDE: 600; 310; 30; 20 INJECTION, SOLUTION INTRAVENOUS at 06:31

## 2023-08-14 RX ADMIN — PROPOFOL 50 MCG/KG/MIN: 10 INJECTION, EMULSION INTRAVENOUS at 08:38

## 2023-08-14 RX ADMIN — CEFAZOLIN SODIUM 2000 MG: 1 POWDER, FOR SOLUTION INTRAMUSCULAR; INTRAVENOUS at 08:52

## 2023-08-14 RX ADMIN — ROCURONIUM BROMIDE 30 MG: 10 INJECTION, SOLUTION INTRAVENOUS at 09:21

## 2023-08-14 RX ADMIN — ROCURONIUM BROMIDE 40 MG: 10 INJECTION, SOLUTION INTRAVENOUS at 08:43

## 2023-08-14 RX ADMIN — PHENYLEPHRINE HYDROCHLORIDE 150 MCG: 10 INJECTION INTRAVENOUS at 09:01

## 2023-08-14 RX ADMIN — Medication 100 MG: at 08:31

## 2023-08-14 RX ADMIN — FENTANYL CITRATE 25 MCG: 50 INJECTION, SOLUTION INTRAMUSCULAR; INTRAVENOUS at 08:28

## 2023-08-14 RX ADMIN — GLYCOPYRROLATE 0.2 MG: 0.2 INJECTION, SOLUTION INTRAMUSCULAR; INTRAVENOUS at 09:02

## 2023-08-14 RX ADMIN — LIDOCAINE HYDROCHLORIDE 100 MG: 20 INJECTION, SOLUTION EPIDURAL; INFILTRATION; INTRACAUDAL; PERINEURAL at 08:28

## 2023-08-14 RX ADMIN — BUPIVACAINE HYDROCHLORIDE 20 ML: 5 INJECTION, SOLUTION EPIDURAL; INTRACAUDAL; PERINEURAL at 08:10

## 2023-08-14 RX ADMIN — PROPOFOL 120 MG: 10 INJECTION, EMULSION INTRAVENOUS at 08:31

## 2023-08-14 RX ADMIN — ROCURONIUM BROMIDE 10 MG: 10 INJECTION, SOLUTION INTRAVENOUS at 08:31

## 2023-08-14 ASSESSMENT — PAIN - FUNCTIONAL ASSESSMENT
PAIN_FUNCTIONAL_ASSESSMENT: 0-10
PAIN_FUNCTIONAL_ASSESSMENT: ACTIVITIES ARE NOT PREVENTED

## 2023-08-14 ASSESSMENT — PAIN DESCRIPTION - DESCRIPTORS: DESCRIPTORS: ACHING

## 2023-08-14 NOTE — BRIEF OP NOTE
BRIEF OPERATIVE NOTE    Date of Procedure: 8/14/23  Preoperative Diagnosis: Osteoarthritis of glenohumeral joint, right [M19.011]  Postoperative Diagnosis: * No post-op diagnosis entered *    Procedure: Procedure(s):  RIGHT TOTAL SHOULDER ARTHROPLASTY WITH BICEPS TENODESIS AND AXILLARY NERVE DISSECTION (GEN/EXPAREL REGIONAL NERVE BLOCK    Surgeon: David Prabhakar MD  Assistant(s): Nancy Crow PA-C, ATC  Anesthesia: General   Estimated Blood Loss: minimal  Specimens: * No specimens in log *   Findings: Osteoarthritis. Complications: None  Implants:   Implant Name Type Inv.  Item Serial No.  Lot No. LRB No. Used Action   BUTTON SUT [id number] TI FOR JERSON BKUP FIBERWIRE FIX OF ACL PCL - SN/A  BUTTON SUT [id number] TI FOR JERSON BKUP FIBERWIRE FIX OF ACL PCL N/A [facility] [id number] Right 1 Implanted   CEMENT BNE RADIOPAQUE FAST SET ACRYL RESIN HI VISC SIMPLEXHV - SN/A  CEMENT BNE RADIOPAQUE FAST SET ACRYL RESIN HI VISC SIMPLEXHV N/A [employer] [facility] [id number] Right 1 Implanted   POST ORTH MOD TRABECULAR MTL ALLIANCE - SN/A  POST ORTH MOD TRABECULAR MTL ALLIANCE N/A [employer] [facility] [id number] Right 1 Implanted   alliance glenoid size 4   N/A  [id number] Right 1 Implanted   ADAPTER HUM HD STD TAPR FOR COMPHSVE REV SHLDR SYS - SN/A  ADAPTER HUM HD STD TAPR FOR COMPHSVE REV SHLDR SYS N/A [employer] [facility] [id number] Right 1 Implanted   HEAD HUM H18MM OD53MM [id number] SHLDR CO CHROM MOD NECKLESS - SN/A  HEAD HUM H18MM OD53MM ID46MM SHLDR CO CHROM MOD NECKLESS N/A [employer]- [id number] Right 1 Implanted   STEM HUM L55MM [id number] JAMES SHLDR CO CHROM POR RITIKA PRESSFIT - SN/A  STEM HUM L55MM [id number] JAMES SHLDR CO CHROM POR RITIKA PRESSFIT N/A [employer]- [id number] Right 1 Implanted

## 2023-08-14 NOTE — ANESTHESIA POSTPROCEDURE EVALUATION
Department of Anesthesiology  Postprocedure Note    Patient: Kurt Siegel  MRN: [id number]  YOB: 1945  Date of evaluation: 8/14/2023      Procedure Summary     Date: 08/14/23 Room / Location: [facility] MAIN OR  / M MAIN OR    Anesthesia Start: 4642 Anesthesia Stop: 1059    Procedure: RIGHT TOTAL SHOULDER ARTHROPLASTY WITH BICEPS TENODESIS AND AXILLARY NERVE DISSECTION (GEN/EXPAREL REGIONAL NERVE BLOCK (Right: Shoulder) Diagnosis:       Osteoarthritis of glenohumeral joint, right      (Osteoarthritis of glenohumeral joint, right [M19.011])    Surgeons: Andre Latham MD Responsible Provider: Ele Goldsmith MD    Anesthesia Type: Regional, General ASA Status: 3          Anesthesia Type: Regional, General    Lance Phase I: Lance Score: 8    Lance Phase II:        Anesthesia Post Evaluation    Patient location during evaluation: bedside  Airway patency: patent  Nausea & Vomiting: no nausea and no vomiting  Complications: no  Cardiovascular status: hemodynamically stable  Respiratory status: acceptable  Comments: VITALS REVIEWED

## 2023-08-14 NOTE — ANESTHESIA PROCEDURE NOTES
Peripheral Block    Patient location during procedure: pre-op  Reason for block: post-op pain management  Start time: 8/14/2023 8:10 AM  End time: 8/14/2023 8:17 AM  Staffing  Performed: anesthesiologist   Anesthesiologist: George Martinez MD  Preanesthetic Checklist  Completed: patient identified, IV checked, site marked, risks and benefits discussed, surgical/procedural consents, equipment checked, pre-op evaluation, timeout performed, anesthesia consent given, oxygen available, monitors applied/VS acknowledged, fire risk safety assessment completed and verbalized and blood product R/B/A discussed and consented  Peripheral Block   Patient position: supine  Prep: ChloraPrep  Provider prep: mask  Patient monitoring: continuous pulse ox, IV access, oxygen and responsive to questions  Block type: Brachial plexus  Interscalene  Laterality: right  Injection technique: single-shot  Guidance: ultrasound guided    Needle   Needle type: insulated echogenic nerve stimulator needle   Needle gauge: 22 G  Needle localization: anatomical landmarks and ultrasound guidance  Assessment   Injection assessment: negative aspiration for heme, no paresthesia on injection, local visualized surrounding nerve on ultrasound and no intravascular symptoms  Paresthesia pain: none  Slow fractionated injection: yes  Hemodynamics: stable  Real-time US image taken/store: yes  Outcomes: uncomplicated and patient tolerated procedure well    Additional Notes  The surgeon has consulted the department of regional anesthesia to place a peripheral nerve block for post-operative pain management for this patient. If a PNB catheter is placed, infusion is anticipated for up to 72 hours post-operatively unless otherwise discussed.      Prior to the above procedure, the alternatives, risks, side effects, and potential complications (including anesthetic toxicity, seizures, death, temporary and/or permanent nerve injury, and loss of extremity function) of the

## 2023-08-14 NOTE — H&P
Date of Surgery Update:  Mila Ley was seen and examined. History and physical has been reviewed. The patient has been examined. There have been no significant clinical changes since the completion of the originally dated History and Physical.    Shoulder Arthroplasty   Mila Ley presented with advanced degenerative joint disease of the shoulder with radiographic evidence of severe joint space narrowing. Previous non-operative treatments have been tried including rest, ice, activity modifications, pain medications, and injectable treatments. The pain and impairment have progressively worsened now limiting ADLS and having a negative impact on quality of life. The risks, benefits and potential complications of the procedure have been discussed with the patient and all questions have been answered satisfactorily. The patient was counseled at length about the risks of corbin Covid-19 during their perioperative period and any recovery window from their procedure. The patient was made aware that corbin Covid-19  may worsen their prognosis for recovering from their procedure and lend to a higher morbidity and/or mortality risk. All material risks, benefits, and reasonable alternatives including postponing the procedure were discussed. The patient does wish to proceed with the procedure at this time.       Signed By: Desmond Cade MD     August 14, 2023 7:08 AM

## (undated) DEVICE — Device

## (undated) DEVICE — SOLUTION IV 1000ML 0.9% SOD CHL

## (undated) DEVICE — STERILE POLYISOPRENE POWDER-FREE SURGICAL GLOVES: Brand: PROTEXIS

## (undated) DEVICE — X-RAY SPONGES,16 PLY: Brand: DERMACEA

## (undated) DEVICE — BIT DRL L5IN DIA2.8MM STD ST S STL TWST BUSA

## (undated) DEVICE — YANKAUER OPEN TIP, NO VENT: Brand: ARGYLE

## (undated) DEVICE — SYRINGE CATH TIP 50ML

## (undated) DEVICE — APPLICATOR BNDG 1MM ADH PREMIERPRO EXOFIN

## (undated) DEVICE — BUR SURG HD L5MM DIA5MM RND FLUT REPL CARB LINVATEC

## (undated) DEVICE — INFECTION CONTROL KIT SYS

## (undated) DEVICE — CEMENT MIXING SYSTEM WITH FEMORAL BREAKWAY NOZZLE: Brand: REVOLUTION

## (undated) DEVICE — TIBURON SPLIT SHEET: Brand: CONVERTORS

## (undated) DEVICE — PLASMABLADE PS210-030S 3.0S LOCK: Brand: PLASMABLADE™

## (undated) DEVICE — 3M™ MEDIPORE™ H SOFT CLOTH SURGICAL TAPE 2864, 4 INCH X 10 YARD (10CM X 9,14M), 12 ROLLS/CASE: Brand: 3M™ MEDIPORE™

## (undated) DEVICE — SUTURE FIBERWIRE SZ 2 W/ TAPERED NEEDLE BLUE L38IN NONABSORB BLU L26.5MM 1/2 CIRCLE AR7200

## (undated) DEVICE — MEDI-VAC NON-CONDUCTIVE SUCTION TUBING: Brand: CARDINAL HEALTH

## (undated) DEVICE — TELFA NON-ADHERENT ABSORBENT DRESSING: Brand: TELFA

## (undated) DEVICE — BOOT ORTH XL KNEE GRN TYVEK HI CVR SKID RESIST HEAT SEAL E

## (undated) DEVICE — (D)PREP SKN CHLRAPRP APPL 26ML -- CONVERT TO ITEM 371833

## (undated) DEVICE — 3M™ IOBAN™ 2 ANTIMICROBIAL INCISE DRAPE 6651EZ: Brand: IOBAN™ 2

## (undated) DEVICE — 4-PORT MANIFOLD: Brand: NEPTUNE 2

## (undated) DEVICE — T4 HOOD

## (undated) DEVICE — DRAPE,REIN 53X77,STERILE: Brand: MEDLINE

## (undated) DEVICE — HANDPIECE SET WITH COAXIAL HIGH FLOW TIP AND SUCTION TUBE: Brand: INTERPULSE

## (undated) DEVICE — REM POLYHESIVE ADULT PATIENT RETURN ELECTRODE: Brand: VALLEYLAB

## (undated) DEVICE — COVER,MAYO STAND,STERILE: Brand: MEDLINE

## (undated) DEVICE — SOLUTION IRRIG 3000ML 0.9% SOD CHL FLX CONT 0797208] ICU MEDICAL INC]

## (undated) DEVICE — T-MAX DISPOSABLE FACE MASK 8 PER BOX

## (undated) DEVICE — LIGHT HANDLE: Brand: DEVON

## (undated) DEVICE — STRYKER PERFORMANCE SERIES SAGITTAL BLADE: Brand: STRYKER PERFORMANCE SERIES

## (undated) DEVICE — GOWN,BREATHABLE,IMP SLV 3XL AURORA: Brand: MEDLINE

## (undated) DEVICE — HANDPIECE SET WITH COAXIAL FAN SPRAY TIP AND SUCTION TUBE: Brand: INTERPULSE

## (undated) DEVICE — 3M™ TEGADERM™ TRANSPARENT FILM DRESSING FRAME STYLE, 1628, 6 IN X 8 IN (15 CM X 20 CM), 10/CT 8CT/CASE: Brand: 3M™ TEGADERM™

## (undated) DEVICE — SUTURE VCRL SZ 2-0 L27IN ABSRB UD L36MM CP-1 1/2 CIR REV J266H

## (undated) DEVICE — STERILE POLYISOPRENE POWDER-FREE SURGICAL GLOVES WITH EMOLLIENT COATING: Brand: PROTEXIS

## (undated) DEVICE — SUTURE MCRYL SZ 4-0 L27IN ABSRB UD L19MM PS-2 1/2 CIR PRIM Y426H

## (undated) DEVICE — ARGYLE FRAZIER SURGICAL SUCTION INSTRUMENT 10 FR/CH (3.3 MM): Brand: ARGYLE

## (undated) DEVICE — 3M™ IOBAN™ 2 ANTIMICROBIAL INCISE DRAPE 6650EZ: Brand: IOBAN™ 2

## (undated) DEVICE — SUTURE VCRL SZ 0 L36IN ABSRB UD L40MM CT 1/2 CIR TAPERPOINT J958H

## (undated) DEVICE — BUTTON SUT DIA12MM TI FOR PRI BKUP FIBERWIRE FIX OF ACL PCL

## (undated) DEVICE — DRAPE,U/ SHT,SPLIT,PLAS,STERIL: Brand: MEDLINE

## (undated) DEVICE — DEVON™ KNEE AND BODY STRAP 60" X 3" (1.5 M X 7.6 CM): Brand: DEVON

## (undated) DEVICE — SUTURE FIBERWIRE SZ 5 L38IN BLU L48MM 1/2 CIR CONVENTIONAL AR7213